# Patient Record
Sex: FEMALE | Race: WHITE | NOT HISPANIC OR LATINO | Employment: OTHER | URBAN - METROPOLITAN AREA
[De-identification: names, ages, dates, MRNs, and addresses within clinical notes are randomized per-mention and may not be internally consistent; named-entity substitution may affect disease eponyms.]

---

## 2018-08-27 LAB — HCV AB SER-ACNC: NEGATIVE

## 2022-09-13 ENCOUNTER — OFFICE VISIT (OUTPATIENT)
Dept: FAMILY MEDICINE CLINIC | Facility: CLINIC | Age: 57
End: 2022-09-13

## 2022-09-13 VITALS
HEART RATE: 65 BPM | WEIGHT: 249.2 LBS | TEMPERATURE: 97.8 F | BODY MASS INDEX: 39.11 KG/M2 | OXYGEN SATURATION: 98 % | HEIGHT: 67 IN | SYSTOLIC BLOOD PRESSURE: 128 MMHG | DIASTOLIC BLOOD PRESSURE: 82 MMHG

## 2022-09-13 DIAGNOSIS — I89.0 LYMPHEDEMA OF LEFT LEG: ICD-10-CM

## 2022-09-13 DIAGNOSIS — F41.9 ANXIETY: ICD-10-CM

## 2022-09-13 DIAGNOSIS — G89.29 CHRONIC PAIN OF LEFT KNEE: ICD-10-CM

## 2022-09-13 DIAGNOSIS — N85.8 UTERINE CYST: ICD-10-CM

## 2022-09-13 DIAGNOSIS — F33.0 MILD EPISODE OF RECURRENT MAJOR DEPRESSIVE DISORDER (HCC): ICD-10-CM

## 2022-09-13 DIAGNOSIS — Z12.31 ENCOUNTER FOR SCREENING MAMMOGRAM FOR BREAST CANCER: ICD-10-CM

## 2022-09-13 DIAGNOSIS — E03.9 ACQUIRED HYPOTHYROIDISM: ICD-10-CM

## 2022-09-13 DIAGNOSIS — I10 PRIMARY HYPERTENSION: ICD-10-CM

## 2022-09-13 DIAGNOSIS — Z12.4 SCREENING FOR CERVICAL CANCER: ICD-10-CM

## 2022-09-13 DIAGNOSIS — M25.562 CHRONIC PAIN OF LEFT KNEE: ICD-10-CM

## 2022-09-13 DIAGNOSIS — E78.2 MIXED HYPERLIPIDEMIA: ICD-10-CM

## 2022-09-13 DIAGNOSIS — Z00.00 ANNUAL PHYSICAL EXAM: Primary | ICD-10-CM

## 2022-09-13 PROCEDURE — 99386 PREV VISIT NEW AGE 40-64: CPT | Performed by: NURSE PRACTITIONER

## 2022-09-13 RX ORDER — MILK THISTLE 500 MG
CAPSULE ORAL
COMMUNITY

## 2022-09-13 RX ORDER — LEVOTHYROXINE AND LIOTHYRONINE 57; 13.5 UG/1; UG/1
90 TABLET ORAL DAILY
Qty: 30 TABLET | Refills: 2 | Status: SHIPPED | OUTPATIENT
Start: 2022-09-13

## 2022-09-13 RX ORDER — DILTIAZEM HYDROCHLORIDE 180 MG/1
360 CAPSULE, COATED, EXTENDED RELEASE ORAL DAILY
COMMUNITY
Start: 2020-09-12

## 2022-09-13 RX ORDER — CITALOPRAM 20 MG/1
10 TABLET ORAL DAILY
COMMUNITY
Start: 2002-09-12

## 2022-09-13 RX ORDER — MELATONIN
10000 DAILY
COMMUNITY

## 2022-09-13 RX ORDER — LEVOTHYROXINE AND LIOTHYRONINE 57; 13.5 UG/1; UG/1
90 TABLET ORAL DAILY
COMMUNITY
Start: 2017-09-12 | End: 2022-09-13 | Stop reason: SDUPTHER

## 2022-09-13 RX ORDER — ATORVASTATIN CALCIUM 10 MG/1
10 TABLET, FILM COATED ORAL DAILY
COMMUNITY
Start: 2019-09-12

## 2022-09-13 RX ORDER — CHLORAL HYDRATE 500 MG
1000 CAPSULE ORAL DAILY
COMMUNITY

## 2022-09-13 NOTE — PATIENT INSTRUCTIONS

## 2022-09-13 NOTE — PROGRESS NOTES
ADULT ANNUAL [address]    NAME: Lenka Wayne  AGE: 61 y o  SEX: female  : 1965     DATE: 2022     Assessment and Plan:     Problem List Items Addressed This Visit        Endocrine    Acquired hypothyroidism    Relevant Medications    thyroid (ARMOUR) 90 MG tablet       Cardiovascular and Mediastinum    Primary hypertension    Relevant Medications    diltiazem (CARDIZEM CD) 180 mg 24 hr capsule       Other    Mixed hyperlipidemia - Primary    Relevant Medications    atorvastatin (LIPITOR) 10 mg tablet    Mild episode of recurrent major depressive disorder (HCC)    Relevant Medications    citalopram (CeleXA) 20 mg tablet    Other Relevant Orders    Ambulatory Referral to [id number]    Relevant Orders    Ambulatory Referral to Paddy Kim      Other Visit Diagnoses     Screening for cervical cancer        Relevant Orders    Ambulatory referral to Obstetrics / Gynecology    Encounter for screening mammogram for breast cancer        Relevant Orders    Mammo screening bilateral w 3d & cad    Annual physical exam        BMI 39 0-39 9,adult              Immunizations and preventive care screenings were discussed with patient today  Appropriate education was printed on patient's after visit summary  Counseling:  Exercise: the importance of regular exercise/physical activity was discussed  Recommend exercise 3-5 times per week for at least 30 minutes  · Chronic issues  BMI Counseling: Body mass index is 39 62 kg/m²  The BMI is above normal  Nutrition recommendations include encouraging healthy choices of fruits and vegetables  Rationale for BMI follow-up plan is due to patient being overweight or obese  Refill sent for thyroid medication  Reviewed lab work completed in April  She is due for a mammogram in 2022      With the history of a uterine cyst I feel it would be best for her to establish with GYN  Notes from past GYN in [facility]  Unsure of last colonoscopy - unable to locate in her records  Suspect left leg swelling is from left knee issues - many past injuries, she has an appt upcoming with an orthopedic  She is also start physical therapy for her left knee  Return in 6 months (on 3/13/2023)  Chief Complaint:     Chief Complaint   Patient presents with    Physical Exam    BMI follow up due      History of Present Illness:     Adult Annual Physical   Patient here for a comprehensive physical exam  The patient reports problems - establish care, needs thyroid med refill       Diet and Physical Activity  · Diet/Nutrition: well balanced diet  · Exercise: no formal exercise  Depression Screening  PHQ-2/9 Depression Screening    Little interest or pleasure in doing things: 1 - several days  Feeling down, depressed, or hopeless: 1 - several days  PHQ-2 Score: 2  PHQ-2 Interpretation: Negative depression screen       General Health  · Sleep: sleeps well  · Hearing: normal - bilateral   · Vision: no vision problems  · Dental: brushes teeth twice daily  /GYN Health  · Patient is: postmenopausal  ·      Review of Systems:     Review of Systems   Constitutional: Negative  HENT: Negative  Respiratory: Negative  Cardiovascular: Positive for leg swelling (states of some left leg swelling, history of left knee injuries - larger than right when compared  Swelling lessens with elevation and increased movement  )  Gastrointestinal: Negative  Neurological: Negative  All other systems reviewed and are negative  Past Medical History:     Past Medical History:   Diagnosis Date    Atrial fibrillation ([facility] [address] ) 10/2020      Past Surgical History:     History reviewed  No pertinent surgical history     Social History:     Social History     Socioeconomic History    Marital status: Unknown     Spouse name: None    Number of children: None    Years of education: None    Highest education level: None   Occupational History    None   Tobacco Use    Smoking status: Never Smoker    Smokeless tobacco: Never Used   Substance and Sexual Activity    Alcohol use: Not Currently    Drug use: None    Sexual activity: None   Other Topics Concern    None   Social History Narrative    None     Social Determinants of Health     Financial Resource Strain: Not on file   Food Insecurity: Not on file   Transportation Needs: Not on file   Physical Activity: Not on file   Stress: Not on file   Social Connections: Not on file   Intimate Partner Violence: Not on file   Housing Stability: Not on file      Family History:     History reviewed  No pertinent family history  Current Medications:     Current Outpatient Medications   Medication Sig Dispense Refill    atorvastatin (LIPITOR) 10 mg tablet Take 10 mg by mouth in the morning      cholecalciferol (VITAMIN D3) 1,000 units tablet Take 10,000 Units by mouth daily      citalopram (CeleXA) 20 mg tablet Take 10 mg by mouth in the morning      co-enzyme Q-10 30 MG capsule Take 100 mg by mouth 3 (three) times a day      Digestive Enzymes (SIMILASE PO) Take 2 capsules by mouth 2 (two) times a day      diltiazem (CARDIZEM CD) 180 mg 24 hr capsule Take 360 mg by mouth in the morning      Magnesium 400 MG CAPS Take by mouth      Milk Thistle 500 MG CAPS Take by mouth      Omega-3 Fatty Acids (fish oil) 1,000 mg Take 1,000 mg by mouth daily      thyroid (ARMOUR) 90 MG tablet Take 90 mg by mouth in the morning       No current facility-administered medications for this visit  Allergies:      Allergies   Allergen Reactions    Penicillins Other (See Comments)     Family hx of allergy to penicillin      Physical Exam:     /82 (BP Location: Right arm, Patient Position: Sitting)   Pulse 65   Temp 97 8 °F (36 6 °C)   Ht 5' 6 5" (1 689 m)   Wt 113 kg (249 lb 3 2 oz)   SpO2 98%   BMI 39 62 kg/m²     Physical Exam  Vitals and nursing note reviewed  Constitutional:       General: She is not in acute distress  Appearance: Normal appearance  She is well-developed  HENT:      Head: Normocephalic and atraumatic  Eyes:      Conjunctiva/sclera: Conjunctivae normal    Cardiovascular:      Rate and Rhythm: Normal rate and regular rhythm  Heart sounds: No murmur heard  No gallop  Pulmonary:      Effort: Pulmonary effort is normal  No respiratory distress  Breath sounds: Normal breath sounds  Abdominal:      Palpations: Abdomen is soft  Tenderness: There is no abdominal tenderness  Musculoskeletal:      Cervical back: Neck supple  Left knee: Swelling and deformity present  Decreased range of motion  Skin:     General: Skin is warm and dry  Neurological:      General: No focal deficit present  Mental Status: She is alert and oriented to person, place, and time  Mental status is at baseline  Psychiatric:         Mood and Affect: Mood normal          Behavior: Behavior normal          Thought Content:  Thought content normal          Judgment: Judgment normal           PIERRE Mclaughlin  [facility] PRIMARY [facility]

## 2022-09-15 ENCOUNTER — TELEPHONE (OUTPATIENT)
Dept: ADMINISTRATIVE | Facility: OTHER | Age: 57
End: 2022-09-15

## 2022-09-15 NOTE — TELEPHONE ENCOUNTER
Upon review of the In Basket request we were able to locate, review, and update the patient chart as requested for Mammogram     Any additional questions or concerns should be emailed to the Practice Liaisons via [email or web address] email, please do not reply via In Basket      Thank you  Ravi Ruiz

## 2022-09-15 NOTE — TELEPHONE ENCOUNTER
----- Message from Renato Arredondo sent at 9/14/2022  3:59 PM EDT -----  Regarding: Care Gap Update  09/14/22 3:59 PM    Hello, our patient attached above has had Mammogram completed/performed  Please assist in updating the patient chart by pulling the Care Everywhere (CE) document  The date of service is 11/2021       Thank you,  Renato Arredondo   [facility]

## 2022-09-29 DIAGNOSIS — G89.29 CHRONIC PAIN OF BOTH KNEES: Primary | ICD-10-CM

## 2022-09-29 DIAGNOSIS — M25.562 CHRONIC PAIN OF BOTH KNEES: Primary | ICD-10-CM

## 2022-09-29 DIAGNOSIS — I89.0 LYMPHEDEMA OF LEFT LEG: ICD-10-CM

## 2022-09-29 DIAGNOSIS — M25.561 CHRONIC PAIN OF BOTH KNEES: Primary | ICD-10-CM

## 2022-11-01 ENCOUNTER — HOSPITAL ENCOUNTER (OUTPATIENT)
Dept: RADIOLOGY | Facility: CLINIC | Age: 57
Discharge: HOME | End: 2022-11-01

## 2022-11-01 VITALS — BODY MASS INDEX: 39.24 KG/M2 | WEIGHT: 250 LBS | HEIGHT: 67 IN

## 2022-11-01 DIAGNOSIS — Z12.31 ENCOUNTER FOR SCREENING MAMMOGRAM FOR BREAST CANCER: ICD-10-CM

## 2023-01-18 ENCOUNTER — CONSULT (OUTPATIENT)
Dept: FAMILY MEDICINE CLINIC | Facility: CLINIC | Age: 58
End: 2023-01-18

## 2023-01-18 VITALS
BODY MASS INDEX: 39.86 KG/M2 | DIASTOLIC BLOOD PRESSURE: 72 MMHG | OXYGEN SATURATION: 94 % | HEART RATE: 74 BPM | SYSTOLIC BLOOD PRESSURE: 139 MMHG | HEIGHT: 66 IN | WEIGHT: 248 LBS | TEMPERATURE: 98 F

## 2023-01-18 DIAGNOSIS — Z99.89 CPAP (CONTINUOUS POSITIVE AIRWAY PRESSURE) DEPENDENCE: ICD-10-CM

## 2023-01-18 DIAGNOSIS — G47.33 OBSTRUCTIVE SLEEP APNEA SYNDROME: ICD-10-CM

## 2023-01-18 DIAGNOSIS — E03.9 ACQUIRED HYPOTHYROIDISM: ICD-10-CM

## 2023-01-18 DIAGNOSIS — Z01.818 PREOPERATIVE EXAMINATION: Primary | ICD-10-CM

## 2023-01-18 DIAGNOSIS — I10 PRIMARY HYPERTENSION: ICD-10-CM

## 2023-01-18 DIAGNOSIS — M17.0 BILATERAL PRIMARY OSTEOARTHRITIS OF KNEE: ICD-10-CM

## 2023-01-18 DIAGNOSIS — E78.2 MIXED HYPERLIPIDEMIA: ICD-10-CM

## 2023-01-18 PROBLEM — I48.0 PAROXYSMAL ATRIAL FIBRILLATION (HCC): Status: RESOLVED | Noted: 2020-10-21 | Resolved: 2023-01-18

## 2023-01-18 PROBLEM — I48.0 PAROXYSMAL ATRIAL FIBRILLATION (HCC): Status: ACTIVE | Noted: 2020-10-21

## 2023-01-18 RX ORDER — MELOXICAM 15 MG/1
TABLET ORAL
COMMUNITY
Start: 2022-12-20

## 2023-01-18 RX ORDER — B-COMPLEX WITH VITAMIN C
2 TABLET ORAL
COMMUNITY

## 2023-01-18 NOTE — PROGRESS NOTES
Name: Ana Lilia Gutierrez      : 1965      MRN: [id number]  Encounter Provider: PIERRE Bucio  Encounter Date: 2023   Encounter department: [address] [place] [address],[address]     1  Preoperative examination    Assessment of Chronic Conditions:  Hypothyroidism  Hypertension  Hyperlipidemia - on statin  Sleep Apnea - uses cpap    Assessment of Cardiac Risk:  Hx of HLD, on statin    Renal:  GFR 70 (2022)    Prior Anesthesia Reactions:  None      Anticoagulant Use? No      Medically Stable to proceed with surgery? Yes    Meds to take AM of surgery:    Would recommend diltiazem and possibly thyroid medication  Subjective      Here today for a preoperative examination  Pt has been seen by orthopedic surgery to have a left knee replacement  B/l knee xray's show end-stage arthritis of both knees  Her left knee though is worse than her right  She is unable to straighten that knee  She has difficulty going up stairs  She is with a past episode of atrial fibrillation that was corrected with starting diltiazem  Reports she wore a holter monitor after that for two weeks with no aberrant rhythm noted  She is not on any blood thinning medication but remains on diltiazem as it has help control her blood pressure  She is with a hx of sleep apnea but uses a CPAP machine nightly  Review of Systems   Cardiovascular:        See HPI   Musculoskeletal:        See HPI   All other systems reviewed and are negative        Current Outpatient Medications on File Prior to Visit   Medication Sig   • atorvastatin (LIPITOR) 10 mg tablet Take 10 mg by mouth in the morning   • cholecalciferol (VITAMIN D3) 1,000 units tablet Take 10,000 Units by mouth daily   • citalopram (CeleXA) 20 mg tablet Take 10 mg by mouth in the morning   • co-enzyme Q-10 30 MG capsule Take 100 mg by mouth 3 (three) times a day   • Digestive Enzymes (SIMILASE PO) Take 2 capsules by mouth 2 (two) times a day   • diltiazem (CARDIZEM CD) 180 mg 24 hr capsule Take 360 mg by mouth in the morning   • Magnesium 400 MG CAPS Take by mouth   • Milk Thistle 500 MG CAPS Take by mouth   • Omega-3 Fatty Acids (fish oil) 1,000 mg Take 1,000 mg by mouth daily   • thyroid (ARMOUR) 90 MG tablet Take 1 tablet (90 mg total) by mouth in the morning       Objective     There were no vitals taken for this visit  Physical Exam  Constitutional:       Appearance: Normal appearance  Cardiovascular:      Rate and Rhythm: Normal rate and regular rhythm  Pulmonary:      Effort: Pulmonary effort is normal       Breath sounds: Normal breath sounds  Neurological:      Mental Status: She is alert  Psychiatric:         Mood and Affect: Mood normal          Behavior: Behavior normal          Thought Content:  Thought content normal          Judgment: Judgment normal        PIERRE Falk

## 2023-02-06 DIAGNOSIS — I10 PRIMARY HYPERTENSION: Primary | ICD-10-CM

## 2023-02-06 RX ORDER — DILTIAZEM HYDROCHLORIDE 180 MG/1
360 CAPSULE, COATED, EXTENDED RELEASE ORAL DAILY
Qty: 30 CAPSULE | Refills: 3 | Status: SHIPPED | OUTPATIENT
Start: 2023-02-06

## 2023-02-08 ENCOUNTER — TELEPHONE (OUTPATIENT)
Dept: PSYCHIATRY | Facility: CLINIC | Age: 58
End: 2023-02-08

## 2023-02-08 NOTE — TELEPHONE ENCOUNTER
Contacted patient in regards to Routine Referral in attempts to verify patient's needs of services and add patient to proper wait list  spoke with patient whom stated they were working with different facility for counseling and would not like to pursure through [facility]        Referral closed per request

## 2023-03-15 ENCOUNTER — OFFICE VISIT (OUTPATIENT)
Dept: FAMILY MEDICINE CLINIC | Facility: CLINIC | Age: 58
End: 2023-03-15

## 2023-03-15 VITALS
SYSTOLIC BLOOD PRESSURE: 130 MMHG | WEIGHT: 246.4 LBS | OXYGEN SATURATION: 98 % | HEIGHT: 66 IN | BODY MASS INDEX: 39.6 KG/M2 | DIASTOLIC BLOOD PRESSURE: 82 MMHG | HEART RATE: 92 BPM | TEMPERATURE: 97.8 F

## 2023-03-15 DIAGNOSIS — E03.9 ACQUIRED HYPOTHYROIDISM: ICD-10-CM

## 2023-03-15 DIAGNOSIS — G47.33 OBSTRUCTIVE SLEEP APNEA SYNDROME: ICD-10-CM

## 2023-03-15 DIAGNOSIS — Z99.89 CPAP (CONTINUOUS POSITIVE AIRWAY PRESSURE) DEPENDENCE: ICD-10-CM

## 2023-03-15 DIAGNOSIS — Z96.652 STATUS POST LEFT KNEE REPLACEMENT: ICD-10-CM

## 2023-03-15 DIAGNOSIS — I10 PRIMARY HYPERTENSION: ICD-10-CM

## 2023-03-15 DIAGNOSIS — F33.0 MILD EPISODE OF RECURRENT MAJOR DEPRESSIVE DISORDER (HCC): Primary | ICD-10-CM

## 2023-03-15 RX ORDER — ASPIRIN 81 MG/1
TABLET ORAL
COMMUNITY
Start: 2023-02-17

## 2023-03-15 RX ORDER — DOCUSATE SODIUM 100 MG/1
CAPSULE, LIQUID FILLED ORAL
COMMUNITY
Start: 2023-02-17

## 2023-03-15 RX ORDER — OXYCODONE HYDROCHLORIDE 5 MG/1
TABLET ORAL
COMMUNITY
Start: 2023-02-17

## 2023-03-15 RX ORDER — SENNOSIDES 8.6 MG/1
TABLET, COATED ORAL
COMMUNITY
Start: 2023-02-17

## 2023-03-15 RX ORDER — PANTOPRAZOLE SODIUM 40 MG/1
TABLET, DELAYED RELEASE ORAL
COMMUNITY
Start: 2023-02-17

## 2023-03-15 RX ORDER — TRAMADOL HYDROCHLORIDE 50 MG/1
TABLET ORAL
COMMUNITY
Start: 2023-02-17

## 2023-03-15 RX ORDER — CELECOXIB 100 MG/1
CAPSULE ORAL
COMMUNITY
Start: 2023-02-17

## 2023-03-15 RX ORDER — UREA 10 %
1 LOTION (ML) TOPICAL
COMMUNITY

## 2023-03-15 NOTE — PROGRESS NOTES
Name: Celesta Moritz      : 1965      MRN: [id number]  Encounter Provider: Mey MondayPIERRE  Encounter Date: 3/15/2023   Encounter department: [address] [place] [address],[address]     1  Mild episode of recurrent major depressive disorder (HCC)  Assessment & Plan:  Stable, no concerns at this time, continues with citalopram         2  Primary hypertension  Assessment & Plan:  Controlled, continue with current medication regimen  3  CPAP (continuous positive airway pressure) dependence  Assessment & Plan:  Continues, no issues  4  Obstructive sleep apnea syndrome    5  Acquired hypothyroidism  Assessment & Plan:  Continues with thyroid medication  6  Status post left knee replacement     Overall is doing well post knee replacement  She is doing well with PT twice a week at this time  Subjective      Here today for a follow-up ;  She recently had left knee replacement surgery in February and reports she is recovering well  She is undergoing physical therapy bi-weekly with success  She is only taking acetaminophen for pain  Hx of HTN and HLD - no issues at his time  Hx of hypothyroid - on medication  Hx of sleep apnea - uses a CPAP  Review of Systems   Constitutional: Negative  HENT: Negative  Respiratory: Negative  Cardiovascular: Negative  Gastrointestinal: Negative  Musculoskeletal:        See HPI   Neurological: Negative  All other systems reviewed and are negative        Current Outpatient Medications on File Prior to Visit   Medication Sig   • aspirin (ECOTRIN LOW STRENGTH) 81 mg EC tablet    • atorvastatin (LIPITOR) 10 mg tablet Take 10 mg by mouth in the morning   • B Complex Vitamins (Vitamin B Complex) TABS Take 2 tablets by mouth   • Barberry-Oreg Grape-Goldenseal 200-200-50 MG CAPS Take 500 mg by mouth   • celecoxib (CeleBREX) 100 mg capsule    • cholecalciferol (VITAMIN D3) 1,000 units tablet Take 10,000 Units by mouth daily   • citalopram (CeleXA) 20 mg tablet Take 10 mg by mouth in the morning   • co-enzyme Q-10 30 MG capsule Take 100 mg by mouth 3 (three) times a day   • Digestive Enzymes (SIMILASE PO) Take 2 capsules by mouth 2 (two) times a day   • diltiazem (CARDIZEM CD) 180 mg 24 hr capsule Take 2 capsules (360 mg total) by mouth in the morning   • docusate sodium (COLACE) 100 mg capsule    • Lactobacillus TABS Take 1 tablet by mouth   • Magnesium 400 MG CAPS Take by mouth   • meloxicam (MOBIC) 15 mg tablet TAKE 1 TABLET (15 MG) BY MOUTH IN THE MORNING  • Milk Thistle 500 MG CAPS Take by mouth   • Omega-3 Fatty Acids (fish oil) 1,000 mg Take 1,000 mg by mouth daily   • pantoprazole (PROTONIX) 40 mg tablet    • Senna-Time 8 6 MG tablet    • thyroid (ARMOUR) 90 MG tablet Take 1 tablet (90 mg total) by mouth in the morning   • oxyCODONE (ROXICODONE) 5 immediate release tablet    • traMADol (ULTRAM) 50 mg tablet        Objective     /82 (BP Location: Right arm, Patient Position: Sitting, Cuff Size: Standard)   Pulse 92   Temp 97 8 °F (36 6 °C)   Ht 5' 6" (1 676 m)   Wt 112 kg (246 lb 6 4 oz)   SpO2 98%   BMI 39 77 kg/m²     Physical Exam  Constitutional:       Appearance: Normal appearance  Cardiovascular:      Rate and Rhythm: Normal rate and regular rhythm  Pulmonary:      Effort: Pulmonary effort is normal       Breath sounds: Normal breath sounds  Neurological:      Mental Status: She is alert  Psychiatric:         Mood and Affect: Mood normal          Behavior: Behavior normal          Thought Content:  Thought content normal          Judgment: Judgment normal        PIERRE Szymanski

## 2023-03-27 DIAGNOSIS — I10 PRIMARY HYPERTENSION: ICD-10-CM

## 2023-03-27 RX ORDER — DILTIAZEM HYDROCHLORIDE 180 MG/1
CAPSULE, COATED, EXTENDED RELEASE ORAL
Qty: 30 CAPSULE | Refills: 3 | Status: SHIPPED | OUTPATIENT
Start: 2023-03-27

## 2023-05-08 DIAGNOSIS — F41.9 ANXIETY: Primary | ICD-10-CM

## 2023-05-08 RX ORDER — CITALOPRAM 20 MG/1
20 TABLET ORAL DAILY
COMMUNITY
End: 2023-05-08 | Stop reason: SDUPTHER

## 2023-05-08 RX ORDER — CITALOPRAM 20 MG/1
10 TABLET ORAL DAILY
Qty: 45 TABLET | Refills: 1 | Status: SHIPPED | OUTPATIENT
Start: 2023-05-08

## 2023-06-07 NOTE — PATIENT INSTRUCTIONS
Wellness Visit for Adults   AMBULATORY CARE:   A wellness visit  is when you see your healthcare provider to get screened for health problems  Your healthcare provider will also give you advice on how to stay healthy  Write down your questions so you remember to ask them  Ask your healthcare provider how often you should have a wellness visit  What happens at a wellness visit:  Your healthcare provider will ask about your health, and your family history of health problems  This includes high blood pressure, heart disease, and cancer  He or she will ask if you have symptoms that concern you, if you smoke, and about your mood  You may also be asked about your intake of medicines, supplements, food, and alcohol  Any of the following may be done: Your weight  will be checked  Your height may also be checked so your body mass index (BMI) can be calculated  Your BMI shows if you are at a healthy weight  Your blood pressure  and heart rate will be checked  Your temperature may also be checked  Blood and urine tests  may be done  Blood tests may be done to check your cholesterol levels  Abnormal cholesterol levels increase your risk for heart disease and stroke  You may also need a blood or urine test to check for diabetes if you are at increased risk  Urine tests may be done to look for signs of an infection or kidney disease  A physical exam  includes checking your heartbeat and lungs with a stethoscope  Your healthcare provider may also check your skin to look for sun damage  Screening tests  may be recommended  A screening test is done to check for diseases that may not cause symptoms  The screening tests you may need depend on your age, gender, family history, and lifestyle habits  For example, colorectal screening may be recommended if you are 48years old or older  Screening tests you need if you are a woman:   A Pap smear  is used to screen for cervical cancer   Pap smears are usually done every 3 to 5 years depending on your age  You may need them more often if you have had abnormal Pap smear test results in the past  Ask your healthcare provider how often you should have a Pap smear  A mammogram  is an x-ray of your breasts to screen for breast cancer  Experts recommend mammograms every 2 years starting at age 48 years  You may need a mammogram at age 52 years or younger if you have an increased risk for breast cancer  Talk to your healthcare provider about when you should start having mammograms and how often you need them  Vaccines you may need:   Get an influenza vaccine  every year  The influenza vaccine protects you from the flu  Several types of viruses cause the flu  The viruses change over time, so new vaccines are made each year  Get a tetanus-diphtheria (Td) booster vaccine  every 10 years  This vaccine protects you against tetanus and diphtheria  Tetanus is a severe infection that may cause painful muscle spasms and lockjaw  Diphtheria is a severe bacterial infection that causes a thick covering in the back of your mouth and throat  Get a human papillomavirus (HPV) vaccine  if you are female and aged 23 to 32 or male 23 to 24 and never received it  This vaccine protects you from HPV infection  HPV is the most common infection spread by sexual contact  HPV may also cause vaginal, penile, and anal cancers  Get a pneumococcal vaccine  if you are aged 72 years or older  The pneumococcal vaccine is an injection given to protect you from pneumococcal disease  Pneumococcal disease is an infection caused by pneumococcal bacteria  The infection may cause pneumonia, meningitis, or an ear infection  Get a shingles vaccine  if you are 60 or older, even if you have had shingles before  The shingles vaccine is an injection to protect you from the varicella-zoster virus  This is the same virus that causes chickenpox   Shingles is a painful rash that develops in people who had chickenpox or have been exposed to the virus  How to eat healthy:  My Plate is a model for planning healthy meals  It shows the types and amounts of foods that should go on your plate  Fruits and vegetables make up about half of your plate, and grains and protein make up the other half  A serving of dairy is included on the side of your plate  The amount of calories and serving sizes you need depends on your age, gender, weight, and height  Examples of healthy foods are listed below:  Eat a variety of vegetables  such as dark green, red, and orange vegetables  You can also include canned vegetables low in sodium (salt) and frozen vegetables without added butter or sauces  Eat a variety of fresh fruits , canned fruit in 100% juice, frozen fruit, and dried fruit  Include whole grains  At least half of the grains you eat should be whole grains  Examples include whole-wheat bread, wheat pasta, brown rice, and whole-grain cereals such as oatmeal     Eat a variety of protein foods such as seafood (fish and shellfish), lean meat, and poultry without skin (turkey and chicken)  Examples of lean meats include pork leg, shoulder, or tenderloin, and beef round, sirloin, tenderloin, and extra lean ground beef  Other protein foods include eggs and egg substitutes, beans, peas, soy products, nuts, and seeds  Choose low-fat dairy products such as skim or 1% milk or low-fat yogurt, cheese, and cottage cheese  Limit unhealthy fats  such as butter, hard margarine, and shortening  Exercise:  Exercise at least 30 minutes per day on most days of the week  Some examples of exercise include walking, biking, dancing, and swimming  You can also fit in more physical activity by taking the stairs instead of the elevator or parking farther away from stores  Include muscle strengthening activities 2 days each week  Regular exercise provides many health benefits   It helps you manage your weight, and decreases your risk for type 2 diabetes, heart disease, stroke, and high blood pressure  Exercise can also help improve your mood  Ask your healthcare provider about the best exercise plan for you  General health and safety guidelines:   Do not smoke  Nicotine and other chemicals in cigarettes and cigars can cause lung damage  Ask your healthcare provider for information if you currently smoke and need help to quit  E-cigarettes or smokeless tobacco still contain nicotine  Talk to your healthcare provider before you use these products  Limit alcohol  A drink of alcohol is 12 ounces of beer, 5 ounces of wine, or 1½ ounces of liquor  Lose weight, if needed  Being overweight increases your risk of certain health conditions  These include heart disease, high blood pressure, type 2 diabetes, and certain types of cancer  Protect your skin  Do not sunbathe or use tanning beds  Use sunscreen with a SPF 15 or higher  Apply sunscreen at least 15 minutes before you go outside  Reapply sunscreen every 2 hours  Wear protective clothing, hats, and sunglasses when you are outside  Drive safely  Always wear your seatbelt  Make sure everyone in your car wears a seatbelt  A seatbelt can save your life if you are in an accident  Do not use your cell phone when you are driving  This could distract you and cause an accident  Pull over if you need to make a call or send a text message  Practice safe sex  Use latex condoms if are sexually active and have more than one partner  Your healthcare provider may recommend screening tests for sexually transmitted infections (STIs)  Wear helmets, lifejackets, and protective gear  Always wear a helmet when you ride a bike or motorcycle, go skiing, or play sports that could cause a head injury  Wear protective equipment when you play sports  Wear a lifejacket when you are on a boat or doing water sports      © Copyright [employer] 2022 Information is for End User's use only and may not be sold, redistributed or otherwise used for commercial purposes  The above information is an  only  It is not intended as medical advice for individual conditions or treatments  Talk to your doctor, nurse or pharmacist before following any medical regimen to see if it is safe and effective for you

## 2023-06-07 NOTE — PROGRESS NOTES
Assessment        Diagnoses and all orders for this visit:    Encntr for gyn exam (general) (routine) w/o abn findings    Cervical cancer screening  -     Liquid-based pap, screening    Intramural leiomyoma of uterus  -     US pelvis complete w transvaginal; Future    Postmenopausal bleeding  -     US pelvis complete w transvaginal; Future             Plan      All questions answered  Await pap smear results  Discussed healthy lifestyle modifications  Educational material distributed  Follow up in 3 weeks  Follow up as needed  Pap smear  Pelvic ultrasound  Patient with history of fibroids  Pelvic ultrasound was ordered  Also recommended endometrial biopsy due to postmenopausal bleeding  Patient to return to the office for discussion of results, endometrial biopsy  Keara Lewis is a 60 y o  female who presents for annual exam       Chief Complaint   Patient presents with   • New Patient Visit   • Gynecologic Exam     Pt states that a few years ago she had a benign 7 cm cyst, pt with AUB for 1 day and was just when whiping, period cramping, previous GYN did recommend Hysterectomy  Mother had hx of endometriosis  Previous OBG[facility] [place]  7 on uterus, biopsies in uterus- benign    She has no periods  She has some pain in pelvic area, and noticed some blood on toilet paper on Monday  Family h/o endometriosis  She is not sexually active       passed away in Aug 2020 due to prostate CA      Last Pap: 19 NILM   Last mammogram: 22  Colorectal cancer screenin years, due every 10 years    Current contraception: post menopausal status  History of abnormal Pap smear: no  History of abnormal mammogram: no  Family history of uterine or ovarian cancer: no  Family history of breast cancer: no  Family history of colon cancer: no    Family h/o esophageal ca  Mom - peritoneal ca      OB History    Para Term  AB Living   0 0 0 0 0 0   SAB IAB Ectopic Multiple Live Births   0 0 0 0 0       Menstrual History:  OB History        0    Para   0    Term   0            AB        Living           SAB        IAB        Ectopic        Multiple        Live Births                    Menarche age: 14  No LMP recorded   Patient is postmenopausal    Menopause: 45          Past Medical History:   Diagnosis Date   • Atrial fibrillation ([facility] [address] ) 10/2020     Past Surgical History:   Procedure Laterality Date   • BREAST BIOPSY Left     stereotactic bx - benign   • KNEE SURGERY Left 2023     Family History   Problem Relation Age of Onset   • Cancer Mother    • Esophageal cancer Father    • Prostate cancer Father    • No Known Problems Sister    • No Known Problems Sister    • No Known Problems Maternal Grandmother    • Prostate cancer Maternal Grandfather    • No Known Problems Paternal Grandmother    • No Known Problems Paternal Grandfather    • Stomach cancer Maternal Aunt    • Leukemia Maternal Aunt    • Breast cancer Maternal Aunt    • Breast cancer Paternal Aunt    • No Known Problems Paternal Aunt        Social History     Tobacco Use   • Smoking status: Never   • Smokeless tobacco: Never   Vaping Use   • Vaping Use: Never used   Substance Use Topics   • Alcohol use: Not Currently   • Drug use: Never          Current Outpatient Medications:   •  atorvastatin (LIPITOR) 10 mg tablet, Take 10 mg by mouth in the morning, Disp: , Rfl:   •  B Complex Vitamins (Vitamin B Complex) TABS, Take 2 tablets by mouth, Disp: , Rfl:   •  Barberry-Oreg Grape-Goldenseal 200-200-50 MG CAPS, Take 500 mg by mouth, Disp: , Rfl:   •  cholecalciferol (VITAMIN D3) 1,000 units tablet, Take 10,000 Units by mouth daily, Disp: , Rfl:   •  citalopram (CeleXA) 20 mg tablet, Take 0 5 tablets (10 mg total) by mouth in the morning, Disp: 45 tablet, Rfl: 1  •  co-enzyme Q-10 30 MG capsule, Take 100 mg by mouth 2 (two) times a day, Disp: , Rfl:   •  Digestive Enzymes (SIMILASE PO), Take 1 capsule by "mouth 2 (two) times a day, Disp: , Rfl:   •  diltiazem (CARDIZEM CD) 180 mg 24 hr capsule, TAKE 2 CAPSULES (360 MG) BY MOUTH IN THE MORNING, Disp: 30 capsule, Rfl: 3  •  Lactobacillus TABS, Take 1 tablet by mouth, Disp: , Rfl:   •  Magnesium 400 MG CAPS, Take by mouth, Disp: , Rfl:   •  Milk Thistle 500 MG CAPS, Take by mouth, Disp: , Rfl:   •  NP Thyroid 90 MG tablet, TAKE ONE TABLET BY MOUTH EVERY MORNING (Patient taking differently: Take 45 mg by mouth daily), Disp: 30 tablet, Rfl: 5  •  Omega-3 Fatty Acids (fish oil) 1,000 mg, Take 1,000 mg by mouth daily, Disp: , Rfl:     Allergies   Allergen Reactions   • Food Other (See Comments)     Wheat GI upset; joint inflammation   • Other Other (See Comments)     ENVIRONMENTAL ALLERGENS  Pollen, dust, mold : sneezing  PLANTS  Sneezing  PET DANDER  Sneezing     • Penicillins Other (See Comments)     Family hx of allergy to penicillin   • Wheat Bran - Food Allergy Abdominal Pain   • Horse-Derived Products Rash     Horse serum           Review of Systems    /70 (BP Location: Right arm, Patient Position: Sitting, Cuff Size: Adult)   Pulse 74   Temp (!) 97 1 °F (36 2 °C) (Tympanic)   Ht 5' 6\" (1 676 m)   Wt 115 kg (254 lb 9 6 oz)   BMI 41 09 kg/m²         OBGyn Exam        Future Appointments   Date Time Provider Rudy Hankins   9/19/2023  9:00 AM PIERRE Mclaughlin  [facility]   11/3/2023  8:30 AM [facility]     US GYN TRANSVAGINAL    Anatomical Region Laterality Modality   Pelvis -- Ultrasound   OB -- --     Narrative       Patient Name:   EVA TELLO    Date of Birth:  1965    Procedure: US GYN TRANSVAGINAL   -------- ADDENDUM #1 --------       ADDENDUM: CORRECTION TO FIRST CONCLUSION OF THE REPORT  There is a large uterine fibroid that extends from just anterior to the endometrium located in the intramural location that extends to the subserosal component as well   This fibroid currently measures " 4 6 x 3 7 x 6 4 cm  This is in the mid to upper anterior left uterus  This extends towards the endometrium but does not appear to be expanded into the endometrium  This is actually INCREASED IN SIZE since the previous study of December 28, 2010 where this fibroid measured 2 6 x 1 8 cm  In the report I said included decreased in size from a fibroid measuring 7 5 x 4 1 x 5 2 cm  This is an incorrect measurement of the fibroid from December 28, 2010  The correct measurement for the anterior fibroid is 2 6 x 1 8 cm  Electronically signed by: Genesis Cook MD   -------- ORIGINAL REPORT --------       EXAM: Transvaginal pelvic ultrasound     EXAM DATE: January 13, 2020     REASON FOR EXAM: Abnormal uterine bleeding  SCANNING PROTOCOL: Transvaginal pelvic ultrasound was performed per standard protocol   The endovaginal transducer was used for improved visualization of the endometrial cavity and adnexal regions   Doppler assessment of the left ovary was performed  The right ovary cannot be seen but the right adnexal region was evaluated  COMPARISON: Comparison is made with previous study of December 28, 2010     FINDINGS: The uterus is normal in position and measures 9 2 cm in length x 7 3 cm AP x 8 3 cm transverse  The myometrium is homogeneous  There is a large fibroid that 6 stones from the intramural location to the subserosal location measuring 4 6 x 3 7 x 6 4 cm in size just to the left of midline in the anterior mid to upper uterus  The endometrial stripe measures 6 0 mm in width and is normal in appearance   The endometrial measurement was taken from a sagittal image and includes both layers but excludes any intervening fluid  Both adnexal regions were evaluated  The right ovary is not visualized  The left ovary measures 2 4 cm x 1 4 cm x 0 9 cm   The left ovary is normal in appearance   No adnexal mass is identified       There is no significant free fluid in the cul-de-sac        Spectral analysis reveals usual waveform   Arterial inflow and venous outflow is preserved  IMPRESSION:   1  Large uterine fibroid that extends from just anterior to the endometrium located within the intramural location but has a subserosal component as well  This measures 4 6 x 3 7 x 6 4 cm This is located in the anterior mid to upper left uterus  There is 6 dense] of the endometrium but there is no evidence of a extending into the endometrium  This is similar to slightly decrease in size since the previous study of December 28, 2010 where the fibroid measured 7 5 x 4 1 x 5 2 cm    2  Nonvisualization of the right ovary  Normal left ovary  Electronically signed by: Zaki Johnson MD      Patient/Procedure Information:   [address] [place] [address] Street: [id number]    Order Number: [id number]    Accession Number: [id number]    Ordering Provider: Rohit White    Authorizing Provider: Rohit White  Procedure Note    Sera Estrada MD - 01/13/2020   Formatting of this note might be different from the original       Patient Name:   Zeyad Church    Date of Birth:  1965    Procedure: [address] [place] [address]   -------- ADDENDUM #1 --------       ADDENDUM: CORRECTION TO FIRST CONCLUSION OF THE REPORT  There is a large uterine fibroid that extends from just anterior to the endometrium located in the intramural location that extends to the subserosal component as well  This fibroid currently measures 4 6 x 3 7 x 6 4 cm  This is in the mid to upper anterior left uterus  This extends towards the endometrium but does not appear to be expanded into the endometrium  This is actually INCREASED IN SIZE since the previous study of December 28, 2010 where this fibroid measured 2 6 x 1 8 cm  In the report I said included decreased in size from a fibroid measuring 7 5 x 4 1 x 5 2 cm  This is an incorrect measurement of the fibroid from December 28, 2010   The correct measurement for the anterior fibroid is 2 6 x 1 8 cm      Electronically signed by: Chantel Rojas MD   -------- ORIGINAL REPORT --------       EXAM: Transvaginal pelvic ultrasound     EXAM DATE: January 13, 2020     REASON FOR EXAM: Abnormal uterine bleeding  SCANNING PROTOCOL: Transvaginal pelvic ultrasound was performed per standard protocol   The endovaginal transducer was used for improved visualization of the endometrial cavity and adnexal regions   Doppler assessment of the left ovary was performed  The right ovary cannot be seen but the right adnexal region was evaluated  COMPARISON: Comparison is made with previous study of December 28, 2010     FINDINGS: The uterus is normal in position and measures 9 2 cm in length x 7 3 cm AP x 8 3 cm transverse  The myometrium is homogeneous  There is a large fibroid that 6 stones from the intramural location to the subserosal location measuring 4 6 x 3 7 x 6 4 cm in size just to the left of midline in the anterior mid to upper uterus  The endometrial stripe measures 6 0 mm in width and is normal in appearance   The endometrial measurement was taken from a sagittal image and includes both layers but excludes any intervening fluid  Both adnexal regions were evaluated  The right ovary is not visualized  The left ovary measures 2 4 cm x 1 4 cm x 0 9 cm   The left ovary is normal in appearance   No adnexal mass is identified  There is no significant free fluid in the cul-de-sac        Spectral analysis reveals usual waveform   Arterial inflow and venous outflow is preserved  IMPRESSION:   1  Large uterine fibroid that extends from just anterior to the endometrium located within the intramural location but has a subserosal component as well  This measures 4 6 x 3 7 x 6 4 cm This is located in the anterior mid to upper left uterus  There is 6 dense] of the endometrium but there is no evidence of a extending into the endometrium   This is similar to slightly decrease in size since the previous study of December 28, 2010 where the fibroid measured 7 5 x 4 1 x 5 2 cm    2  Nonvisualization of the right ovary  Normal left ovary       Electronically signed by: Chantel Rojas MD      Patient/Procedure Information:   [address] [place] [address]: [id number]    Order Number: [id number]    Accession Number: [id number]    Ordering Provider: Sheri Shah    Authorizing Provider: Sheri Shah  Exam End: 01/13/20 10:25 AM    Specimen Collected: 01/13/20 10:54 AM Last Resulted: 01/13/20 10:59 AM   Received From: [address] [place] [address]  and Affiliates  Result Received: 06/07/23  2:20 PM

## 2023-06-08 ENCOUNTER — OFFICE VISIT (OUTPATIENT)
Dept: OBGYN CLINIC | Facility: CLINIC | Age: 58
End: 2023-06-08

## 2023-06-08 VITALS
BODY MASS INDEX: 40.92 KG/M2 | HEIGHT: 66 IN | HEART RATE: 74 BPM | WEIGHT: 254.6 LBS | TEMPERATURE: 97.1 F | DIASTOLIC BLOOD PRESSURE: 70 MMHG | SYSTOLIC BLOOD PRESSURE: 136 MMHG

## 2023-06-08 DIAGNOSIS — Z12.4 CERVICAL CANCER SCREENING: ICD-10-CM

## 2023-06-08 DIAGNOSIS — D25.1 INTRAMURAL LEIOMYOMA OF UTERUS: ICD-10-CM

## 2023-06-08 DIAGNOSIS — N95.0 POSTMENOPAUSAL BLEEDING: ICD-10-CM

## 2023-06-08 DIAGNOSIS — Z01.419 ENCNTR FOR GYN EXAM (GENERAL) (ROUTINE) W/O ABN FINDINGS: Primary | ICD-10-CM

## 2023-06-08 PROCEDURE — 99386 PREV VISIT NEW AGE 40-64: CPT | Performed by: OBSTETRICS & GYNECOLOGY

## 2023-06-08 PROCEDURE — G0145 SCR C/V CYTO,THINLAYER,RESCR: HCPCS | Performed by: OBSTETRICS & GYNECOLOGY

## 2023-06-08 PROCEDURE — G0476 HPV COMBO ASSAY CA SCREEN: HCPCS | Performed by: OBSTETRICS & GYNECOLOGY

## 2023-06-12 LAB
HPV HR 12 DNA CVX QL NAA+PROBE: NEGATIVE
HPV16 DNA CVX QL NAA+PROBE: NEGATIVE
HPV18 DNA CVX QL NAA+PROBE: NEGATIVE

## 2023-06-14 LAB
LAB AP GYN PRIMARY INTERPRETATION: NORMAL
Lab: NORMAL

## 2023-06-16 ENCOUNTER — HOSPITAL ENCOUNTER (OUTPATIENT)
Dept: ULTRASOUND IMAGING | Facility: HOSPITAL | Age: 58
End: 2023-06-16
Attending: OBSTETRICS & GYNECOLOGY
Payer: COMMERCIAL

## 2023-06-16 DIAGNOSIS — D25.1 INTRAMURAL LEIOMYOMA OF UTERUS: ICD-10-CM

## 2023-06-16 DIAGNOSIS — N95.0 POSTMENOPAUSAL BLEEDING: ICD-10-CM

## 2023-06-16 PROCEDURE — 76856 US EXAM PELVIC COMPLETE: CPT

## 2023-06-16 PROCEDURE — 76830 TRANSVAGINAL US NON-OB: CPT

## 2023-06-22 ENCOUNTER — TELEPHONE (OUTPATIENT)
Dept: OBGYN CLINIC | Facility: CLINIC | Age: 58
End: 2023-06-22

## 2023-06-26 DIAGNOSIS — I10 PRIMARY HYPERTENSION: ICD-10-CM

## 2023-06-26 RX ORDER — DILTIAZEM HYDROCHLORIDE 180 MG/1
360 CAPSULE, COATED, EXTENDED RELEASE ORAL DAILY
Qty: 60 CAPSULE | Refills: 3 | Status: SHIPPED | OUTPATIENT
Start: 2023-06-26

## 2023-07-05 NOTE — PATIENT INSTRUCTIONS
Endometrial Biopsy   WHAT YOU NEED TO KNOW:   Endometrial biopsy is a procedure to remove a tissue sample from the lining of your uterus. This procedure is done through your vagina. DISCHARGE INSTRUCTIONS:   Call your doctor or surgeon if:   You have severe pain that does not go away after you take pain medicine. You have a fever. You have pain or cramping that lasts longer than a few days. You have white or yellow vaginal discharge. You have more vaginal bleeding than you were told to expect. You have questions or concerns about your condition or care. Medicines:   NSAIDs , such as ibuprofen, help decrease swelling, pain, and fever. NSAIDs can cause stomach bleeding or kidney problems in certain people. If you take blood thinner medicine, always ask your healthcare provider if NSAIDs are safe for you. Always read the medicine label and follow directions. Take your medicine as directed. Contact your healthcare provider if you think your medicine is not helping or if you have side effects. Tell your provider if you are allergic to any medicine. Keep a list of the medicines, vitamins, and herbs you take. Include the amounts, and when and why you take them. Bring the list or the pill bottles to follow-up visits. Carry your medicine list with you in case of an emergency. Do not have sex, douche, or use a tampon  for at least 10 to 14 days. Follow up with your doctor or surgeon as directed:  Write down your questions so you remember to ask them during your visits. © Copyright [employer]lesia [employer] 2022 Information is for End User's use only and may not be sold, redistributed or otherwise used for commercial purposes. The above information is an  only. It is not intended as medical advice for individual conditions or treatments. Talk to your doctor, nurse or pharmacist before following any medical regimen to see if it is safe and effective for you.

## 2023-07-05 NOTE — PROGRESS NOTES
Assessment/Plan:  Problem List Items Addressed This Visit    None  Visit Diagnoses     Intramural leiomyoma of uterus    -  Primary    Postmenopausal bleeding             Discussed with patient that vaginal bleeding occurs in approximately 4-11% of postmenopausal patients. Discussed with patient possible etiologies. Differential diagnosis were discussed such as atrophy, endometrial hyperplasia, endometrial cancer, endometrial polyps uterine fibroids, adenomyosis, hormonal side effects (in women taking hormone therapy), anticoagulant therapy or infection. Hypoestrogenism can cause atrophy of the endometrium and vagina. Either endometrial biopsy or transvaginal pelvic ultrasound can be used as initial tests for evaluating the endometrium. Endometrial biopsy is initial diagnostic test for women with postmenopausal bleeding due to its high sensitivity, lower complication rate and low cost.      However, endometrial biopsy is not a sensitive technique for diagnosing structural abnormalities such as polyps or uterine fibroids. Endometrial biopsy is required of endometrial lining is thicker than 4 mm, there is diffuse or focal thickening, if the endometrium is not adequately visualized or if the patient has persistent postmenopausal bleeding. If the patient has insufficient or non-diagnostic tissue from endometrial biopsy procedure or if there is persistent bleeding then further diagnosis with hysteroscopy D&C would be recommended. Postmenopausal women, uterine bleeding is usually self-limited. Exclusion of cancer is the main objective and treatment is usually on necessary once cancer has been excluded. Discussed recommendations and inductions for endometrial sampling via endometrial biopsy in office. Discussed indications such as abnormal uterine bleeding, postmenopausal bleeding,  abnormal Pap,  monitoring for history of endometrial hyperplasia. I described the procedure in detail.    Discussed potential risks associated with procedure including cramping or pain, vasovagal reactions,   Rare complications include excessive uterine bleeding, uterine perforation, (0.1-1.3%) pelvic infection, bacteremia. Patient was instructed to call if with fever, cramping, continued abdominal pain, increasing pain, foul-smelling vaginal discharge or bleeding heavier than a normal period. We will call with results. Discussed with patient pelvic ultrasound results in detail. There is no evidence of intramural or any uterine fibroids. Discussed endometrial thickness is abnormal for postmenopausal patient. Advised patient to call if with persistent postmenopausal bleeding. Otherwise, follow-up next year for annual GYN exam.    Subjective   Patient ID: Ángel Heath is a 60 y.o. female. Patient is here for a problem visit. Chief Complaint   Patient presents with   • Procedure     EMB     Patient has been menopausal for several years. Patient noticed a small amount of vaginal bleeding in . Patient is not sexually active. Patient also reports a history of uterine fibroids    Menstrual History:  OB History        0    Para   0    Term   0       0    AB   0    Living   0       SAB   0    IAB   0    Ectopic   0    Multiple   0    Live Births   0                Menarche age:   No LMP recorded.  Patient is postmenopausal.         Past Medical History:   Diagnosis Date   • Atrial fibrillation ([address]) 10/2020       Past Surgical History:   Procedure Laterality Date   • BREAST BIOPSY Left     stereotactic bx - benign   • KNEE SURGERY Left 2023       Social History     Tobacco Use   • Smoking status: Never   • Smokeless tobacco: Never   Vaping Use   • Vaping Use: Never used   Substance Use Topics   • Alcohol use: Not Currently   • Drug use: Never        Allergies   Allergen Reactions   • Food Other (See Comments)     Wheat GI upset; joint inflammation   • Other Other (See Comments) ENVIRONMENTAL ALLERGENS. Pollen, dust, mold : sneezing  PLANTS. Sneezing  PET DANDER. Sneezing     • Penicillins Other (See Comments)     Family hx of allergy to penicillin   • Wheat Bran - Food Allergy Abdominal Pain   • Horse-Derived Products Rash     Horse serum         Current Outpatient Medications:   •  atorvastatin (LIPITOR) 10 mg tablet, Take 10 mg by mouth in the morning, Disp: , Rfl:   •  B Complex Vitamins (Vitamin B Complex) TABS, Take 2 tablets by mouth, Disp: , Rfl:   •  Barberry-Oreg Grape-Goldenseal 200-200-50 MG CAPS, Take 500 mg by mouth, Disp: , Rfl:   •  cholecalciferol (VITAMIN D3) 1,000 units tablet, Take 10,000 Units by mouth daily, Disp: , Rfl:   •  citalopram (CeleXA) 20 mg tablet, Take 0.5 tablets (10 mg total) by mouth in the morning, Disp: 45 tablet, Rfl: 1  •  co-enzyme Q-10 30 MG capsule, Take 100 mg by mouth 2 (two) times a day, Disp: , Rfl:   •  Digestive Enzymes (SIMILASE PO), Take 1 capsule by mouth 2 (two) times a day, Disp: , Rfl:   •  diltiazem (CARDIZEM CD) 180 mg 24 hr capsule, Take 2 capsules (360 mg total) by mouth daily, Disp: 60 capsule, Rfl: 3  •  Lactobacillus TABS, Take 1 tablet by mouth, Disp: , Rfl:   •  Magnesium 400 MG CAPS, Take by mouth, Disp: , Rfl:   •  Milk Thistle 500 MG CAPS, Take by mouth, Disp: , Rfl:   •  NP Thyroid 90 MG tablet, TAKE ONE TABLET BY MOUTH EVERY MORNING (Patient taking differently: Take 45 mg by mouth daily), Disp: 30 tablet, Rfl: 5  •  Omega-3 Fatty Acids (fish oil) 1,000 mg, Take 1,000 mg by mouth daily, Disp: , Rfl:       Review of Systems   Constitutional: Negative. HENT: Negative. Eyes: Negative. Respiratory: Negative. Cardiovascular: Negative. Gastrointestinal: Negative. Endocrine: Negative. Genitourinary:        As noted in HPI   Musculoskeletal: Negative. Skin: Negative. Allergic/Immunologic: Negative. Neurological: Negative. Hematological: Negative. Psychiatric/Behavioral: Negative.           BP 120/64 (BP Location: Right arm, Patient Position: Sitting, Cuff Size: Adult)   Pulse 64   Temp (!) 97.4 °F (36.3 °C) (Tympanic)   Ht 5' 6" (1.676 m)   Wt 113 kg (250 lb)   BMI 40.35 kg/m²       Physical Exam  Constitutional:       General: She is not in acute distress. Appearance: She is well-developed. Genitourinary:      Bladder and urethral meatus normal.      No lesions in the vagina. Right Labia: No rash, tenderness, lesions, skin changes or Bartholin's cyst.     Left Labia: No tenderness, lesions, skin changes, Bartholin's cyst or rash. No vaginal discharge, erythema, tenderness or bleeding. No vaginal prolapse present. No vaginal atrophy present. Right Adnexa: not tender, not full and no mass present. Left Adnexa: not tender, not full and no mass present. No cervical polyp. Uterus is not enlarged or tender. No uterine mass detected. Pelvic exam was performed with patient in the lithotomy position. Rectum:      No tenderness. Cardiovascular:      Rate and Rhythm: Normal rate. Pulmonary:      Effort: Pulmonary effort is normal.   Abdominal:      General: There is no distension. Palpations: Abdomen is soft. Tenderness: There is no abdominal tenderness. Neurological:      Mental Status: She is alert and oriented to person, place, and time. Skin:     General: Skin is warm and dry.    Psychiatric:         Behavior: Behavior normal.             Future Appointments   Date Time Provider [address] [place] [address]   7/6/2023 11:15 AM Kesha Cazares MD [facility]   9/19/2023  9:00 AM Pascual Lennox, CRNP [facility]   11/3/2023  8:30 AM [facility]   US pelvis complete w transvaginal  Status: Final result     PACS Images     Show images for US pelvis complete w transvaginal    US pelvis complete w transvaginal: Result Notes     Irene Gipson   6/22/2023  3:20 PM EDT       Patient is aware of results, her appointment is scheduled for July 6th. Zafar Watkins   6/22/2023  2:49 PM EDT       LVM for patient to call office. Jose Luis Rosas MD   6/22/2023  2:09 PM EDT       Please call patient with abnormal results. Johnanna Dubin endometrium does show thickened endometrium of 6 mm.  Endometrial biopsy is recommended and required which I believe she is scheduled for on July 6?  Please confirm            Study Result    Narrative & Impression   PELVIC ULTRASOUND, COMPLETE     INDICATION:  The patient is 60years old. D25.1: Intramural leiomyoma of uterus  N95.0: Postmenopausal bleeding.     COMPARISON: None     TECHNIQUE:   Transabdominal pelvic ultrasound was performed in sagittal and transverse planes with a curvilinear transducer. Additional transvaginal imaging was performed to better evaluate the endometrium and ovaries. Imaging included volumetric   sweeps as well as traditional still imaging technique.     FINDINGS:     UTERUS:  The uterus is anteverted in position, measuring 11.3 x 3.8 x 7.1 cm. The uterus has a normal contour and echotexture. The cervix appears within normal limits.     ENDOMETRIUM:  The endometrial echo complex has an AP caliber of 6.0 mm. This is slightly thickened for a postmenopausal patient with bleeding (the risk of carcinoma is ~7% if the endometrium is >5 mm and 0.07% if the endometrium is <5 mm, Bardales-bindman R, Corry Payne. How thick is too thick? When endometrial thickness should prompt biopsy in postmenopausal women without vaginal bleeding. Ultrasound Obstet Gynecol. 8058;45 (5): 558-65. doi:10.1002/uog.1704). No endometrial mass or fluid is seen.     OVARIES/ADNEXA:  Right ovary:  2.5 x 1.7 x 2.2 cm. 5.0 mL. Left ovary:  3.0 x 2.1 x 1.3 cm. 4.4 mL. Ovarian Doppler flow is within normal limits.   No suspicious ovarian or adnexal abnormality.     OTHER:  No free fluid or loculated fluid collections.        IMPRESSION:     Slightly thickened endometrial stripe measuring 6 mm. Recommend gynecological evaluation.     The study was marked in EPIC for significant notification.        Workstation performed: [id number]        Imaging    US pelvis complete w transvaginal (Order: [id number]) - 6/16/2023    Result History    US pelvis complete w transvaginal (Order #[id number]) on 6/22/2023 - Order Result History Report    Order Report     Order Details      Order Questions    Question Answer   Exam reason pelvic pain   Note: Enter reason for exam   Pregnant? No              Result Information    Status Priority Source   Final result (6/22/2023  2:06 PM) Routine        US pelvis complete w transvaginal: Result Notes     Selena Gamble   6/22/2023  3:20 PM EDT         Patient is aware of results, her appointment is scheduled for July 6th. Selena Gamble   6/22/2023  2:49 PM EDT         LVM for patient to call office.     Lorin Mitchell MD   6/22/2023  2:09 PM EDT         Please call patient with abnormal results. Fady Ledesma endometrium does show thickened endometrium of 6 mm.  Endometrial biopsy is recommended and required which I believe she is scheduled for on July 6?  Please confirm

## 2023-07-06 ENCOUNTER — PROCEDURE VISIT (OUTPATIENT)
Dept: OBGYN CLINIC | Facility: CLINIC | Age: 58
End: 2023-07-06

## 2023-07-06 VITALS
DIASTOLIC BLOOD PRESSURE: 64 MMHG | HEART RATE: 64 BPM | BODY MASS INDEX: 40.18 KG/M2 | SYSTOLIC BLOOD PRESSURE: 120 MMHG | TEMPERATURE: 97.4 F | WEIGHT: 250 LBS | HEIGHT: 66 IN

## 2023-07-06 DIAGNOSIS — N95.0 POSTMENOPAUSAL BLEEDING: Primary | ICD-10-CM

## 2023-07-06 DIAGNOSIS — D25.1 INTRAMURAL LEIOMYOMA OF UTERUS: ICD-10-CM

## 2023-07-06 DIAGNOSIS — R93.89 THICKENED ENDOMETRIUM: ICD-10-CM

## 2023-07-06 PROCEDURE — 58100 BIOPSY OF UTERUS LINING: CPT | Performed by: OBSTETRICS & GYNECOLOGY

## 2023-07-06 PROCEDURE — 88305 TISSUE EXAM BY PATHOLOGIST: CPT | Performed by: SPECIALIST

## 2023-07-06 PROCEDURE — 99213 OFFICE O/P EST LOW 20 MIN: CPT | Performed by: OBSTETRICS & GYNECOLOGY

## 2023-07-06 NOTE — PROGRESS NOTES
Endometrial biopsy    Date/Time: 7/6/2023 11:15 AM    Performed by: Glenna Capone MD  Authorized by: Glenna Capone MD  Universal Protocol:  Patient understanding: patient states understanding of the procedure being performed      Indication:     Indications: Post-menopausal bleeding    Procedure:     Procedure: endometrial biopsy with Pipelle      Cervix cleaned and prepped: yes      Local anesthetic:  Benzocaine spray    The cervix was dilated: yes      Uterus sounded: yes      Uterus sound depth (cm):  9    Curettes used:  1    Specimen collected: specimen collected and sent to pathology      Patient tolerated procedure well with no complications: yes

## 2023-07-10 PROCEDURE — 88305 TISSUE EXAM BY PATHOLOGIST: CPT | Performed by: SPECIALIST

## 2023-08-04 DIAGNOSIS — E78.2 MIXED HYPERLIPIDEMIA: Primary | ICD-10-CM

## 2023-08-04 RX ORDER — ATORVASTATIN CALCIUM 10 MG/1
10 TABLET, FILM COATED ORAL DAILY
Qty: 90 TABLET | Refills: 1 | Status: SHIPPED | OUTPATIENT
Start: 2023-08-04

## 2023-08-21 ENCOUNTER — OFFICE VISIT (OUTPATIENT)
Dept: OBGYN CLINIC | Facility: CLINIC | Age: 58
End: 2023-08-21

## 2023-08-21 VITALS
HEART RATE: 65 BPM | BODY MASS INDEX: 40.18 KG/M2 | WEIGHT: 250 LBS | DIASTOLIC BLOOD PRESSURE: 86 MMHG | SYSTOLIC BLOOD PRESSURE: 124 MMHG | HEIGHT: 66 IN

## 2023-08-21 DIAGNOSIS — R93.89 THICKENED ENDOMETRIUM: ICD-10-CM

## 2023-08-21 DIAGNOSIS — N95.0 POSTMENOPAUSAL BLEEDING: Primary | ICD-10-CM

## 2023-08-21 PROCEDURE — 99214 OFFICE O/P EST MOD 30 MIN: CPT | Performed by: OBSTETRICS & GYNECOLOGY

## 2023-08-21 RX ORDER — SODIUM CHLORIDE, SODIUM LACTATE, POTASSIUM CHLORIDE, CALCIUM CHLORIDE 600; 310; 30; 20 MG/100ML; MG/100ML; MG/100ML; MG/100ML
125 INJECTION, SOLUTION INTRAVENOUS CONTINUOUS
OUTPATIENT
Start: 2023-09-08

## 2023-08-21 NOTE — H&P (VIEW-ONLY)
Assessment/Plan:  Problem List Items Addressed This Visit    None  Visit Diagnoses     Postmenopausal bleeding    -  Primary    Relevant Orders    Case request operating room: DILATATION AND CURETTAGE (D&C) WITH HYSTEROSCOPY (Completed)    CBC and Platelet    Type and screen    Basic metabolic panel    Thickened endometrium        Relevant Orders    Case request operating room: DILATATION AND CURETTAGE (D&C) WITH HYSTEROSCOPY (Completed)    CBC and Platelet    Type and screen    Basic metabolic panel           Patient with persistent postmenopausal bleeding. Her previous endometrial biopsy showed insufficient cells. Due to persistent postmenopausal bleeding and thickened endometrium, I recommended hysteroscopy D&C. Discussed with patient associated risks with hysteroscopy noting uterine perforation and fluid overload. Discussed with patient alternatives including no intervention as well as trial of repeat endometrial biopsy. Due to the fact that if with persistent postmenopausal bleeding that D&C would be recommended, we did schedule hysteroscopy and D&C. Discussed with patient indication, risks, benefits and alternatives of surgical exploration. . She agrees and wants to proceed. Informed consent form signed    Preadmission testing labs, chlorhexidine wash or given. Follow-up 2 weeks postop        Subjective  Patient ID: Wendy Contreras is a 60 y.o. female. Patient is here for a problem visit. Chief Complaint   Patient presents with   • Gynecologic Exam     Patient had cramping, slightly more bleeding. Was bright and red. During the rest of the day it subsided. Was to f/u if that occurred. She had some spotting in . She has had cramping and now blood and mucus last week with bright red bleeding. She is not sexually active.         Menstrual History:  OB History        0    Para   0    Term   0       0    AB   0    Living   0       SAB   0    IAB   0    Ectopic   0 Multiple   0    Live Births   0                Menarche age: 14  No LMP recorded. Patient is postmenopausal.     Menopause: 45    Past Medical History:   Diagnosis Date   • Atrial fibrillation ([address]) 10/2020       Past Surgical History:   Procedure Laterality Date   • BREAST BIOPSY Left 2012    stereotactic bx - benign   • KNEE SURGERY Left 02/17/2023       Social History     Tobacco Use   • Smoking status: Never   • Smokeless tobacco: Never   Vaping Use   • Vaping Use: Never used   Substance Use Topics   • Alcohol use: Not Currently   • Drug use: Never        Allergies   Allergen Reactions   • Food Other (See Comments)     Wheat GI upset; joint inflammation   • Other Other (See Comments)     ENVIRONMENTAL ALLERGENS. Pollen, dust, mold : sneezing  PLANTS. Sneezing  PET DANDER.  Sneezing     • Penicillins Other (See Comments)     Family hx of allergy to penicillin   • Wheat Bran - Food Allergy Abdominal Pain   • Horse-Derived Products Rash     Horse serum         Current Outpatient Medications:   •  atorvastatin (LIPITOR) 10 mg tablet, Take 1 tablet (10 mg total) by mouth in the morning, Disp: 90 tablet, Rfl: 1  •  B Complex Vitamins (Vitamin B Complex) TABS, Take 2 tablets by mouth, Disp: , Rfl:   •  Barberry-Oreg Grape-Goldenseal 200-200-50 MG CAPS, Take 500 mg by mouth, Disp: , Rfl:   •  cholecalciferol (VITAMIN D3) 1,000 units tablet, Take 10,000 Units by mouth daily, Disp: , Rfl:   •  citalopram (CeleXA) 20 mg tablet, Take 0.5 tablets (10 mg total) by mouth in the morning, Disp: 45 tablet, Rfl: 1  •  co-enzyme Q-10 30 MG capsule, Take 100 mg by mouth 2 (two) times a day, Disp: , Rfl:   •  Digestive Enzymes (SIMILASE PO), Take 1 capsule by mouth 2 (two) times a day, Disp: , Rfl:   •  diltiazem (CARDIZEM CD) 180 mg 24 hr capsule, Take 2 capsules (360 mg total) by mouth daily, Disp: 60 capsule, Rfl: 3  •  Lactobacillus TABS, Take 1 tablet by mouth, Disp: , Rfl:   •  Magnesium 400 MG CAPS, Take by mouth, Disp: , Rfl: •  Milk Thistle 500 MG CAPS, Take by mouth, Disp: , Rfl:   •  NP Thyroid 90 MG tablet, TAKE ONE TABLET BY MOUTH EVERY MORNING (Patient taking differently: Take 45 mg by mouth daily), Disp: 30 tablet, Rfl: 5  •  Omega-3 Fatty Acids (fish oil) 1,000 mg, Take 1,000 mg by mouth daily, Disp: , Rfl:       Review of Systems   Constitutional: Negative. HENT: Negative. Eyes: Negative. Respiratory: Negative. Cardiovascular: Negative. Gastrointestinal: Negative. Endocrine: Negative. Genitourinary:        As noted in HPI   Musculoskeletal: Negative. Skin: Negative. Allergic/Immunologic: Negative. Neurological: Negative. Hematological: Negative. Psychiatric/Behavioral: Negative. /86 (BP Location: Right arm, Patient Position: Sitting, Cuff Size: Large)   Pulse 65   Ht 5' 6" (1.676 m)   Wt 113 kg (250 lb)   BMI 40.35 kg/m²       Physical Exam  Constitutional:       Appearance: She is well-developed. Genitourinary:      No vaginal discharge or bleeding. Right Adnexa: not tender and no mass present. Left Adnexa: not tender and no mass present. No cervical motion tenderness. Uterus is not enlarged or tender. Neck:      Thyroid: No thyromegaly. Cardiovascular:      Rate and Rhythm: Normal rate and regular rhythm. Heart sounds: Normal heart sounds. Pulmonary:      Effort: Pulmonary effort is normal.      Breath sounds: Normal breath sounds. Abdominal:      General: There is no distension. Palpations: Abdomen is soft. Tenderness: There is no abdominal tenderness. Musculoskeletal:         General: No tenderness. Neurological:      Mental Status: She is alert and oriented to person, place, and time. Skin:     General: Skin is warm and dry.                  US pelvis complete w transvaginal  Status: Final result     PACS Images     Show images for US pelvis complete w transvaginal    US pelvis complete w transvaginal: Result Notes Nina Pretty   6/22/2023  3:20 PM EDT       Patient is aware of results, her appointment is scheduled for July 6th. Nina Pretty   6/22/2023  2:49 PM EDT       LVM for patient to call office. Jose L John MD   6/22/2023  2:09 PM EDT       Please call patient with abnormal results. Royal Kerns endometrium does show thickened endometrium of 6 mm.  Endometrial biopsy is recommended and required which I believe she is scheduled for on July 6?  Please confirm            Study Result    Narrative & Impression   PELVIC ULTRASOUND, COMPLETE     INDICATION:  The patient is 60years old. D25.1: Intramural leiomyoma of uterus  N95.0: Postmenopausal bleeding.     COMPARISON: None     TECHNIQUE:   Transabdominal pelvic ultrasound was performed in sagittal and transverse planes with a curvilinear transducer. Additional transvaginal imaging was performed to better evaluate the endometrium and ovaries. Imaging included volumetric   sweeps as well as traditional still imaging technique.     FINDINGS:     UTERUS:  The uterus is anteverted in position, measuring 11.3 x 3.8 x 7.1 cm. The uterus has a normal contour and echotexture. The cervix appears within normal limits.     ENDOMETRIUM:  The endometrial echo complex has an AP caliber of 6.0 mm. This is slightly thickened for a postmenopausal patient with bleeding (the risk of carcinoma is ~7% if the endometrium is >5 mm and 0.07% if the endometrium is <5 mm, Cortezman KEVYN, Mehran Chery. How thick is too thick? When endometrial thickness should prompt biopsy in postmenopausal women without vaginal bleeding. Ultrasound Obstet Gynecol. 1029;18 (5): 558-65. doi:10.1002/uog.1704). No endometrial mass or fluid is seen.     OVARIES/ADNEXA:  Right ovary:  2.5 x 1.7 x 2.2 cm. 5.0 mL. Left ovary:  3.0 x 2.1 x 1.3 cm. 4.4 mL. Ovarian Doppler flow is within normal limits.   No suspicious ovarian or adnexal abnormality.     OTHER:  No free fluid or loculated fluid collections.        IMPRESSION:     Slightly thickened endometrial stripe measuring 6 mm. Recommend gynecological evaluation.     The study was marked in [facility] for significant notification.        Workstation performed: [id number]        Imaging    US pelvis complete w transvaginal (Order: [id number]) - 6/16/2023    Result History    US pelvis complete w transvaginal (Order #[id number]) on 6/22/2023 - Order Result History Report    Order Report     Order Details      Order Questions    Question Answer   Exam reason pelvic pain   Note: Enter reason for exam   Pregnant? No     Tissue Exam: [id number]  Order: [id number]   Collected 7/6/2023 11:21 AM      Status: Final result      Visible to patient: Yes (not seen)      Dx: Postmenopausal bleeding; Intramural l. ..      2 Result Notes      Component    Case Report   Surgical Pathology Report                         Case: [id number]                                    Authorizing Provider: Jael Johns MD           Collected:           07/06/2023 1121               Ordering Location:     [facility]  Received:            07/06/2023 1121                                      [address] [facility]                                                           Pathologist:           Wiley Wray MD                                                      Specimen:    Endometrium, EMB                                                                           Final Diagnosis   A. Endometrium, EMB:   -  Scant strips of inactive endometrium.   -  No endometrial stroma to evaluate for hyperplasia. -  Scant detached strips of endocervical tissue.       Note: The biopsy is superficial and fragmented. While no malignancy is identified, clinical correlation is required to ensure adequate endometrial sampling.     Electronically signed by Wiley Wray MD on 7/10/2023 at  1:13 PM   Additional Information    All reported additional testing was performed with appropriately reactive controls.  These tests were developed and their performance characteristics determined by [facility] or appropriate performing facility, though some tests may be performed on tissues which have not been validated for performance characteristics (such as staining performed on alcohol exposed cell blocks and decalcified tissues).  Results should be interpreted with caution and in the context of the patients’ clinical condition. These tests may not be cleared or approved by the U.S. Food and Drug Administration, though the FDA has determined that such clearance or approval is not necessary. These tests are used for clinical purposes and they should not be regarded as investigational or for research. This laboratory has been approved by CLIA 88, designated as a high-complexity laboratory and is qualified to perform these tests. Interpretation performed at [facility], [address] [facility] [address] [facility] [place]. Gross Description     A. The specimen is received in formalin, labeled with the patient's name and hospital number, and is designated " EMB". The specimen consists of multiple tan-pink soft tissue fragments measuring in aggregate 0.7 x 0.7 by less than 0.1 cm. Also submitted in the container is mucinous material.  Due to the size and consistency of the specimen, the specimen may not survive histological processing. Entirely submitted. One cassette, in an embedding bag.     Note: The estimated total formalin fixation time based upon information provided by the submitting clinician and the standard processing schedule is under 72 hours.   MSequino   Resulting Agency [facility]              Specimen Collected: 07/06/23 11:21 AM Last Resulted: 07/10/23  1:13 PM        Order Details      View Encounter      Lab and Collection Details      Routing      Result History     View All Conversations on this Encounter           Scans on Order [id number]    Lab Result Document - Document on 7/10/2023  1:13 PM         Result Care Coordination      Result Notes     Rachell Glass   7/13/2023  8:32 AM EDT Back to Top      Patient was informed of test results, states she will monitor it for now and if any bleeding starts she will call office for another appointment.     Abdon Sargent MD   7/12/2023 11:21 PM EDT       Please call the patient regarding her biopsy. Alexi Jean endometrium is technically benign but there was a very small amount of tissue obtained.  There was inadequate sampling to truly determine if there were any precancerous changes.  If her bleeding recurs, we can consider repeat sampling of the endometrium or proceeding with additional testing via hysteroscopy and D&C.  If her bleeding does not recur, no additional testing is needed for       Patient Communication     Add Comments   Add Notifications  Back to Top           Authorizing Provider Information    Name: Abdon Sargent MD Fax: [phone]   Phone: [phone] Pager:      PDF Results and Scans    Document on 7/10/2023  1:13 PM by Virgilio Cabrera MD            Lab and Collection    Tissue Exam (Order: [id number]) - 7/6/2023          Future Appointments   Date Time Provider 4600  [address]   9/19/2023  9:00 AM PIERRE Almanza [facility]   9/21/2023 10:30 AM Abdon Sargent MD [facility]   11/3/2023  8:30 AM [facility]

## 2023-08-21 NOTE — H&P
Assessment/Plan:  Problem List Items Addressed This Visit    None  Visit Diagnoses     Postmenopausal bleeding    -  Primary    Relevant Orders    Case request operating room: DILATATION AND CURETTAGE (D&C) WITH HYSTEROSCOPY (Completed)    CBC and Platelet    Type and screen    Basic metabolic panel    Thickened endometrium        Relevant Orders    Case request operating room: DILATATION AND CURETTAGE (D&C) WITH HYSTEROSCOPY (Completed)    CBC and Platelet    Type and screen    Basic metabolic panel           Patient with persistent postmenopausal bleeding. Her previous endometrial biopsy showed insufficient cells. Due to persistent postmenopausal bleeding and thickened endometrium, I recommended hysteroscopy D&C. Discussed with patient associated risks with hysteroscopy noting uterine perforation and fluid overload. Discussed with patient alternatives including no intervention as well as trial of repeat endometrial biopsy. Due to the fact that if with persistent postmenopausal bleeding that D&C would be recommended, we did schedule hysteroscopy and D&C. Discussed with patient indication, risks, benefits and alternatives of surgical exploration. . She agrees and wants to proceed. Informed consent form signed    Preadmission testing labs, chlorhexidine wash or given. Follow-up 2 weeks postop        Subjective  Patient ID: Jonathan Newman is a 60 y.o. female. Patient is here for a problem visit. Chief Complaint   Patient presents with   • Gynecologic Exam     Patient had cramping, slightly more bleeding. Was bright and red. During the rest of the day it subsided. Was to f/u if that occurred. She had some spotting in . She has had cramping and now blood and mucus last week with bright red bleeding. She is not sexually active.         Menstrual History:  OB History        0    Para   0    Term   0       0    AB   0    Living   0       SAB   0    IAB   0    Ectopic   0 Multiple   0    Live Births   0                Menarche age: 14  No LMP recorded. Patient is postmenopausal.     Menopause: 45    Past Medical History:   Diagnosis Date   • Atrial fibrillation ([address]) 10/2020       Past Surgical History:   Procedure Laterality Date   • BREAST BIOPSY Left 2012    stereotactic bx - benign   • KNEE SURGERY Left 02/17/2023       Social History     Tobacco Use   • Smoking status: Never   • Smokeless tobacco: Never   Vaping Use   • Vaping Use: Never used   Substance Use Topics   • Alcohol use: Not Currently   • Drug use: Never        Allergies   Allergen Reactions   • Food Other (See Comments)     Wheat GI upset; joint inflammation   • Other Other (See Comments)     ENVIRONMENTAL ALLERGENS. Pollen, dust, mold : sneezing  PLANTS. Sneezing  PET DANDER.  Sneezing     • Penicillins Other (See Comments)     Family hx of allergy to penicillin   • Wheat Bran - Food Allergy Abdominal Pain   • Horse-Derived Products Rash     Horse serum         Current Outpatient Medications:   •  atorvastatin (LIPITOR) 10 mg tablet, Take 1 tablet (10 mg total) by mouth in the morning, Disp: 90 tablet, Rfl: 1  •  B Complex Vitamins (Vitamin B Complex) TABS, Take 2 tablets by mouth, Disp: , Rfl:   •  Barberry-Oreg Grape-Goldenseal 200-200-50 MG CAPS, Take 500 mg by mouth, Disp: , Rfl:   •  cholecalciferol (VITAMIN D3) 1,000 units tablet, Take 10,000 Units by mouth daily, Disp: , Rfl:   •  citalopram (CeleXA) 20 mg tablet, Take 0.5 tablets (10 mg total) by mouth in the morning, Disp: 45 tablet, Rfl: 1  •  co-enzyme Q-10 30 MG capsule, Take 100 mg by mouth 2 (two) times a day, Disp: , Rfl:   •  Digestive Enzymes (SIMILASE PO), Take 1 capsule by mouth 2 (two) times a day, Disp: , Rfl:   •  diltiazem (CARDIZEM CD) 180 mg 24 hr capsule, Take 2 capsules (360 mg total) by mouth daily, Disp: 60 capsule, Rfl: 3  •  Lactobacillus TABS, Take 1 tablet by mouth, Disp: , Rfl:   •  Magnesium 400 MG CAPS, Take by mouth, Disp: , Rfl: •  Milk Thistle 500 MG CAPS, Take by mouth, Disp: , Rfl:   •  NP Thyroid 90 MG tablet, TAKE ONE TABLET BY MOUTH EVERY MORNING (Patient taking differently: Take 45 mg by mouth daily), Disp: 30 tablet, Rfl: 5  •  Omega-3 Fatty Acids (fish oil) 1,000 mg, Take 1,000 mg by mouth daily, Disp: , Rfl:       Review of Systems   Constitutional: Negative. HENT: Negative. Eyes: Negative. Respiratory: Negative. Cardiovascular: Negative. Gastrointestinal: Negative. Endocrine: Negative. Genitourinary:        As noted in HPI   Musculoskeletal: Negative. Skin: Negative. Allergic/Immunologic: Negative. Neurological: Negative. Hematological: Negative. Psychiatric/Behavioral: Negative. /86 (BP Location: Right arm, Patient Position: Sitting, Cuff Size: Large)   Pulse 65   Ht 5' 6" (1.676 m)   Wt 113 kg (250 lb)   BMI 40.35 kg/m²       Physical Exam  Constitutional:       Appearance: She is well-developed. Genitourinary:      No vaginal discharge or bleeding. Right Adnexa: not tender and no mass present. Left Adnexa: not tender and no mass present. No cervical motion tenderness. Uterus is not enlarged or tender. Neck:      Thyroid: No thyromegaly. Cardiovascular:      Rate and Rhythm: Normal rate and regular rhythm. Heart sounds: Normal heart sounds. Pulmonary:      Effort: Pulmonary effort is normal.      Breath sounds: Normal breath sounds. Abdominal:      General: There is no distension. Palpations: Abdomen is soft. Tenderness: There is no abdominal tenderness. Musculoskeletal:         General: No tenderness. Neurological:      Mental Status: She is alert and oriented to person, place, and time. Skin:     General: Skin is warm and dry.                  US pelvis complete w transvaginal  Status: Final result     PACS Images     Show images for US pelvis complete w transvaginal    US pelvis complete w transvaginal: Result Notes Jennifer Berger   6/22/2023  3:20 PM EDT       Patient is aware of results, her appointment is scheduled for July 6th. Jennifer Berger   6/22/2023  2:49 PM EDT       LVM for patient to call office. Ale Gaspar MD   6/22/2023  2:09 PM EDT       Please call patient with abnormal results. Manuelito Riley endometrium does show thickened endometrium of 6 mm.  Endometrial biopsy is recommended and required which I believe she is scheduled for on July 6?  Please confirm            Study Result    Narrative & Impression   PELVIC ULTRASOUND, COMPLETE     INDICATION:  The patient is 60years old. D25.1: Intramural leiomyoma of uterus  N95.0: Postmenopausal bleeding.     COMPARISON: None     TECHNIQUE:   Transabdominal pelvic ultrasound was performed in sagittal and transverse planes with a curvilinear transducer. Additional transvaginal imaging was performed to better evaluate the endometrium and ovaries. Imaging included volumetric   sweeps as well as traditional still imaging technique.     FINDINGS:     UTERUS:  The uterus is anteverted in position, measuring 11.3 x 3.8 x 7.1 cm. The uterus has a normal contour and echotexture. The cervix appears within normal limits.     ENDOMETRIUM:  The endometrial echo complex has an AP caliber of 6.0 mm. This is slightly thickened for a postmenopausal patient with bleeding (the risk of carcinoma is ~7% if the endometrium is >5 mm and 0.07% if the endometrium is <5 mm, Bardales-bindman R, Tiffany Santana. How thick is too thick? When endometrial thickness should prompt biopsy in postmenopausal women without vaginal bleeding. Ultrasound Obstet Gynecol. 7414;34 (5): 558-65. doi:10.1002/uog.1704). No endometrial mass or fluid is seen.     OVARIES/ADNEXA:  Right ovary:  2.5 x 1.7 x 2.2 cm. 5.0 mL. Left ovary:  3.0 x 2.1 x 1.3 cm. 4.4 mL. Ovarian Doppler flow is within normal limits.   No suspicious ovarian or adnexal abnormality.     OTHER:  No free fluid or loculated fluid collections.        IMPRESSION:     Slightly thickened endometrial stripe measuring 6 mm. Recommend gynecological evaluation.     The study was marked in [facility] for significant notification.        Workstation performed: [id number]        Imaging    US pelvis complete w transvaginal (Order: [id number]) - 6/16/2023    Result History    US pelvis complete w transvaginal (Order #[id number]) on 6/22/2023 - Order Result History Report    Order Report     Order Details      Order Questions    Question Answer   Exam reason pelvic pain   Note: Enter reason for exam   Pregnant? No     Tissue Exam: [id number]  Order: [id number]   Collected 7/6/2023 11:21 AM      Status: Final result      Visible to patient: Yes (not seen)      Dx: Postmenopausal bleeding; Intramural l. ..      2 Result Notes      Component    Case Report   Surgical Pathology Report                         Case: [id number]                                    Authorizing Provider: Devon Luu MD           Collected:           07/06/2023 1121               Ordering Location:     [facility]  Received:            07/06/2023 1121                                      [address] [facility]                                                           Pathologist:           Argentina Hardin MD                                                      Specimen:    Endometrium, EMB                                                                           Final Diagnosis   A. Endometrium, EMB:   -  Scant strips of inactive endometrium.   -  No endometrial stroma to evaluate for hyperplasia. -  Scant detached strips of endocervical tissue.       Note: The biopsy is superficial and fragmented. While no malignancy is identified, clinical correlation is required to ensure adequate endometrial sampling.     Electronically signed by Argentina Hardin MD on 7/10/2023 at  1:13 PM   Additional Information    All reported additional testing was performed with appropriately reactive controls.  These tests were developed and their performance characteristics determined by [facility] or appropriate performing facility, though some tests may be performed on tissues which have not been validated for performance characteristics (such as staining performed on alcohol exposed cell blocks and decalcified tissues).  Results should be interpreted with caution and in the context of the patients’ clinical condition. These tests may not be cleared or approved by the U.S. Food and Drug Administration, though the FDA has determined that such clearance or approval is not necessary. These tests are used for clinical purposes and they should not be regarded as investigational or for research. This laboratory has been approved by [facility], designated as a high-complexity laboratory and is qualified to perform these tests. Interpretation performed at [facility], [address] [facility] [address] [facility]. Gross Description     A. The specimen is received in formalin, labeled with the patient's name and hospital number, and is designated " EMB". The specimen consists of multiple tan-pink soft tissue fragments measuring in aggregate 0.7 x 0.7 by less than 0.1 cm. Also submitted in the container is mucinous material.  Due to the size and consistency of the specimen, the specimen may not survive histological processing. Entirely submitted. One cassette, in an embedding bag.     Note: The estimated total formalin fixation time based upon information provided by the submitting clinician and the standard processing schedule is under 72 hours.   MSequino   Resulting Agency [facility]              Specimen Collected: 07/06/23 11:21 AM Last Resulted: 07/10/23  1:13 PM        Order Details      View Encounter      Lab and Collection Details      Routing      Result History     View All Conversations on this Encounter           Scans on Order [id number]    Lab Result Document - Document on 7/10/2023  1:13 PM         Result Care Coordination      Result Notes     Ingrid Robledo   7/13/2023  8:32 AM EDT Back to Top      Patient was informed of test results, states she will monitor it for now and if any bleeding starts she will call office for another appointment.     Neal Joseph MD   7/12/2023 11:21 PM EDT       Please call the patient regarding her biopsy. Ha  endometrium is technically benign but there was a very small amount of tissue obtained.  There was inadequate sampling to truly determine if there were any precancerous changes.  If her bleeding recurs, we can consider repeat sampling of the endometrium or proceeding with additional testing via hysteroscopy and D&C.  If her bleeding does not recur, no additional testing is needed for       Patient Communication     Add Comments   Add Notifications  Back to Top           Authorizing Provider Information    Name: Neal Joseph MD Fax: [phone]   Phone: [phone] Pager:      PDF Results and Scans    Document on 7/10/2023  1:13 PM by Isabel Maria MD            Lab and Collection    Tissue Exam (Order: [id number]) - 7/6/2023          Future Appointments   Date Time Provider [address]   9/19/2023  9:00 AM PIERRE Zuleta [facility]   9/21/2023 10:30 AM Neal Joseph MD Complete  [facility]   11/3/2023  8:30 AM [facility]

## 2023-08-22 ENCOUNTER — APPOINTMENT (OUTPATIENT)
Dept: LAB | Facility: CLINIC | Age: 58
End: 2023-08-22

## 2023-08-22 DIAGNOSIS — N95.0 POSTMENOPAUSAL BLEEDING: ICD-10-CM

## 2023-08-22 DIAGNOSIS — R93.89 THICKENED ENDOMETRIUM: ICD-10-CM

## 2023-08-22 LAB
ANION GAP SERPL CALCULATED.3IONS-SCNC: 3 MMOL/L
BUN SERPL-MCNC: 20 MG/DL (ref 5–25)
CALCIUM SERPL-MCNC: 9.5 MG/DL (ref 8.4–10.2)
CHLORIDE SERPL-SCNC: 107 MMOL/L (ref 96–108)
CO2 SERPL-SCNC: 30 MMOL/L (ref 21–32)
CREAT SERPL-MCNC: 0.75 MG/DL (ref 0.6–1.3)
ERYTHROCYTE [DISTWIDTH] IN BLOOD BY AUTOMATED COUNT: 15.2 % (ref 11.6–15.1)
GFR SERPL CREATININE-BSD FRML MDRD: 87 ML/MIN/1.73SQ M
GLUCOSE P FAST SERPL-MCNC: 92 MG/DL (ref 65–99)
HCT VFR BLD AUTO: 42.7 % (ref 34.8–46.1)
HGB BLD-MCNC: 13.8 G/DL (ref 11.5–15.4)
MCH RBC QN AUTO: 27.2 PG (ref 26.8–34.3)
MCHC RBC AUTO-ENTMCNC: 32.3 G/DL (ref 31.4–37.4)
MCV RBC AUTO: 84 FL (ref 82–98)
PLATELET # BLD AUTO: 320 THOUSANDS/UL (ref 149–390)
PMV BLD AUTO: 10.2 FL (ref 8.9–12.7)
POTASSIUM SERPL-SCNC: 4.8 MMOL/L (ref 3.5–5.3)
RBC # BLD AUTO: 5.07 MILLION/UL (ref 3.81–5.12)
SODIUM SERPL-SCNC: 140 MMOL/L (ref 135–147)
WBC # BLD AUTO: 4.83 THOUSAND/UL (ref 4.31–10.16)

## 2023-08-22 PROCEDURE — 80048 BASIC METABOLIC PNL TOTAL CA: CPT

## 2023-08-22 PROCEDURE — 86900 BLOOD TYPING SEROLOGIC ABO: CPT

## 2023-08-22 PROCEDURE — 86850 RBC ANTIBODY SCREEN: CPT

## 2023-08-22 PROCEDURE — 86901 BLOOD TYPING SEROLOGIC RH(D): CPT

## 2023-08-22 PROCEDURE — 85027 COMPLETE CBC AUTOMATED: CPT

## 2023-08-22 PROCEDURE — 36415 COLL VENOUS BLD VENIPUNCTURE: CPT

## 2023-08-24 LAB
ABO GROUP BLD: NORMAL
BLD GP AB SCN SERPL QL: NEGATIVE
RH BLD: NEGATIVE
SPECIMEN EXPIRATION DATE: NORMAL

## 2023-08-29 ENCOUNTER — ANESTHESIA EVENT (OUTPATIENT)
Dept: PERIOP | Facility: HOSPITAL | Age: 58
End: 2023-08-29

## 2023-08-30 NOTE — PRE-PROCEDURE INSTRUCTIONS
Pre-Surgery Instructions:   Medication Instructions   • atorvastatin (LIPITOR) 10 mg tablet Take night before surgery   • B Complex Vitamins (Vitamin B Complex) TABS Stop taking 7 days prior to surgery. • Barberry-Oreg Grape-Goldenseal 200-200-50 MG CAPS Stop taking 7 days prior to surgery. • cholecalciferol (VITAMIN D3) 1,000 units tablet Take night before surgery   • citalopram (CeleXA) 20 mg tablet Stop taking 7 days prior to surgery. • co-enzyme Q-10 30 MG capsule Stop taking 7 days prior to surgery. • Digestive Enzymes (SIMILASE PO) Stop taking 7 days prior to surgery. • diltiazem (CARDIZEM CD) 180 mg 24 hr capsule Take day of surgery. • Lactobacillus TABS Stop taking 7 days prior to surgery. • Magnesium 400 MG CAPS Stop taking 7 days prior to surgery. • Milk Thistle 500 MG CAPS Stop taking 7 days prior to surgery. • NP Thyroid 90 MG tablet Take day of surgery. • Omega-3 Fatty Acids (fish oil) 1,000 mg Stop taking 7 days prior to surgery. Medication instructions for day surgery reviewed. Please use only a sip of water to take your instructed medications. Avoid all over the counter vitamins, supplements and NSAIDS for one week prior to surgery per anesthesia guidelines. Tylenol is ok to take as needed. You will receive a call one business day prior to surgery with an arrival time and hospital directions. If your surgery is scheduled on a Monday, the hospital will be calling you on the Friday prior to your surgery. If you have not heard from anyone by 8pm, please call the hospital supervisor through the hospital  at [phone]. Kavitha Bullock [phone]). Do not eat or drink anything after midnight the night before your surgery, including candy, mints, lifesavers, or chewing gum. Do not drink alcohol 24hrs before your surgery. Try not to smoke at least 24hrs before your surgery.        Follow the pre surgery showering instructions as listed in the [facility] Surgical Experience Booklet” or otherwise provided by your surgeon's office. Do not shave the surgical area 24 hours before surgery. Do not apply any lotions, creams, including makeup, cologne, deodorant, or perfumes after showering on the day of your surgery. No contact lenses, eye make-up, or artificial eyelashes. Remove nail polish, including gel polish, and any artificial, gel, or acrylic nails if possible. Remove all jewelry including rings and body piercing jewelry. Wear causal clothing that is easy to take on and off. Consider your type of surgery. Keep any valuables, jewelry, piercings at home. Please bring any specially ordered equipment (sling, braces) if indicated. Arrange for a responsible person to drive you to and from the hospital on the day of your surgery. Visitor Guidelines discussed. Call the surgeon's office with any new illnesses, exposures, or additional questions prior to surgery. Please reference your [facility] Surgical Experience Booklet” for additional information to prepare for your upcoming surgery.

## 2023-09-03 DIAGNOSIS — F41.9 ANXIETY: ICD-10-CM

## 2023-09-04 RX ORDER — CITALOPRAM 20 MG/1
10 TABLET ORAL DAILY
Qty: 45 TABLET | Refills: 0 | Status: SHIPPED | OUTPATIENT
Start: 2023-09-04

## 2023-09-07 PROBLEM — Z98.890 S/P DILATION AND CURETTAGE: Status: ACTIVE | Noted: 2023-09-07

## 2023-09-07 PROBLEM — R93.89 THICKENED ENDOMETRIUM: Status: ACTIVE | Noted: 2023-09-07

## 2023-09-07 PROBLEM — N95.0 POSTMENOPAUSAL BLEEDING: Status: ACTIVE | Noted: 2023-09-07

## 2023-09-08 ENCOUNTER — HOSPITAL ENCOUNTER (OUTPATIENT)
Facility: HOSPITAL | Age: 58
Setting detail: OUTPATIENT SURGERY
Discharge: HOME | End: 2023-09-08
Attending: OBSTETRICS & GYNECOLOGY | Admitting: OBSTETRICS & GYNECOLOGY

## 2023-09-08 ENCOUNTER — ANESTHESIA (OUTPATIENT)
Dept: PERIOP | Facility: HOSPITAL | Age: 58
End: 2023-09-08

## 2023-09-08 VITALS
HEIGHT: 66 IN | SYSTOLIC BLOOD PRESSURE: 141 MMHG | RESPIRATION RATE: 18 BRPM | DIASTOLIC BLOOD PRESSURE: 87 MMHG | WEIGHT: 247.14 LBS | HEART RATE: 61 BPM | BODY MASS INDEX: 39.72 KG/M2 | TEMPERATURE: 97.3 F | OXYGEN SATURATION: 97 %

## 2023-09-08 DIAGNOSIS — N95.0 POSTMENOPAUSAL BLEEDING: ICD-10-CM

## 2023-09-08 DIAGNOSIS — R93.89 THICKENED ENDOMETRIUM: ICD-10-CM

## 2023-09-08 PROBLEM — I48.91 ATRIAL FIBRILLATION (HCC): Status: ACTIVE | Noted: 2020-10-21

## 2023-09-08 PROBLEM — E66.9 CLASS 2 OBESITY WITHOUT SERIOUS COMORBIDITY IN ADULT: Status: ACTIVE | Noted: 2023-09-08

## 2023-09-08 PROBLEM — E66.812 CLASS 2 OBESITY WITHOUT SERIOUS COMORBIDITY IN ADULT: Status: ACTIVE | Noted: 2023-09-08

## 2023-09-08 LAB
ABO GROUP BLD: NORMAL
RH BLD: NEGATIVE

## 2023-09-08 PROCEDURE — 58558 HYSTEROSCOPY BIOPSY: CPT | Performed by: OBSTETRICS & GYNECOLOGY

## 2023-09-08 PROCEDURE — 88305 TISSUE EXAM BY PATHOLOGIST: CPT | Performed by: PATHOLOGY

## 2023-09-08 RX ORDER — ONDANSETRON 2 MG/ML
4 INJECTION INTRAMUSCULAR; INTRAVENOUS EVERY 6 HOURS PRN
Status: DISCONTINUED | OUTPATIENT
Start: 2023-09-08 | End: 2023-09-08 | Stop reason: HOSPADM

## 2023-09-08 RX ORDER — EPHEDRINE SULFATE 50 MG/ML
INJECTION INTRAVENOUS AS NEEDED
Status: DISCONTINUED | OUTPATIENT
Start: 2023-09-08 | End: 2023-09-08

## 2023-09-08 RX ORDER — ONDANSETRON 2 MG/ML
INJECTION INTRAMUSCULAR; INTRAVENOUS AS NEEDED
Status: DISCONTINUED | OUTPATIENT
Start: 2023-09-08 | End: 2023-09-08

## 2023-09-08 RX ORDER — MAGNESIUM HYDROXIDE 1200 MG/15ML
LIQUID ORAL AS NEEDED
Status: DISCONTINUED | OUTPATIENT
Start: 2023-09-08 | End: 2023-09-08 | Stop reason: HOSPADM

## 2023-09-08 RX ORDER — FENTANYL CITRATE/PF 50 MCG/ML
25 SYRINGE (ML) INJECTION
Status: DISCONTINUED | OUTPATIENT
Start: 2023-09-08 | End: 2023-09-08 | Stop reason: HOSPADM

## 2023-09-08 RX ORDER — ACETAMINOPHEN 325 MG/1
975 TABLET ORAL EVERY 6 HOURS PRN
Status: DISCONTINUED | OUTPATIENT
Start: 2023-09-08 | End: 2023-09-08 | Stop reason: HOSPADM

## 2023-09-08 RX ORDER — MIDAZOLAM HYDROCHLORIDE 2 MG/2ML
INJECTION, SOLUTION INTRAMUSCULAR; INTRAVENOUS AS NEEDED
Status: DISCONTINUED | OUTPATIENT
Start: 2023-09-08 | End: 2023-09-08

## 2023-09-08 RX ORDER — ONDANSETRON 2 MG/ML
4 INJECTION INTRAMUSCULAR; INTRAVENOUS ONCE AS NEEDED
Status: DISCONTINUED | OUTPATIENT
Start: 2023-09-08 | End: 2023-09-08 | Stop reason: HOSPADM

## 2023-09-08 RX ORDER — SODIUM CHLORIDE, SODIUM LACTATE, POTASSIUM CHLORIDE, CALCIUM CHLORIDE 600; 310; 30; 20 MG/100ML; MG/100ML; MG/100ML; MG/100ML
125 INJECTION, SOLUTION INTRAVENOUS CONTINUOUS
Status: DISCONTINUED | OUTPATIENT
Start: 2023-09-08 | End: 2023-09-08 | Stop reason: HOSPADM

## 2023-09-08 RX ORDER — SODIUM CHLORIDE 9 MG/ML
125 INJECTION, SOLUTION INTRAVENOUS CONTINUOUS
Status: DISCONTINUED | OUTPATIENT
Start: 2023-09-08 | End: 2023-09-08 | Stop reason: HOSPADM

## 2023-09-08 RX ORDER — IBUPROFEN 600 MG/1
600 TABLET ORAL EVERY 6 HOURS PRN
Status: DISCONTINUED | OUTPATIENT
Start: 2023-09-08 | End: 2023-09-08 | Stop reason: HOSPADM

## 2023-09-08 RX ORDER — PROPOFOL 10 MG/ML
INJECTION, EMULSION INTRAVENOUS AS NEEDED
Status: DISCONTINUED | OUTPATIENT
Start: 2023-09-08 | End: 2023-09-08

## 2023-09-08 RX ORDER — LIDOCAINE HYDROCHLORIDE 20 MG/ML
INJECTION, SOLUTION EPIDURAL; INFILTRATION; INTRACAUDAL; PERINEURAL AS NEEDED
Status: DISCONTINUED | OUTPATIENT
Start: 2023-09-08 | End: 2023-09-08

## 2023-09-08 RX ORDER — DEXAMETHASONE SODIUM PHOSPHATE 10 MG/ML
INJECTION, SOLUTION INTRAMUSCULAR; INTRAVENOUS AS NEEDED
Status: DISCONTINUED | OUTPATIENT
Start: 2023-09-08 | End: 2023-09-08

## 2023-09-08 RX ORDER — FENTANYL CITRATE 50 UG/ML
INJECTION, SOLUTION INTRAMUSCULAR; INTRAVENOUS AS NEEDED
Status: DISCONTINUED | OUTPATIENT
Start: 2023-09-08 | End: 2023-09-08

## 2023-09-08 RX ADMIN — DEXAMETHASONE SODIUM PHOSPHATE 8 MG: 10 INJECTION INTRAMUSCULAR; INTRAVENOUS at 14:47

## 2023-09-08 RX ADMIN — ONDANSETRON 4 MG: 2 INJECTION INTRAMUSCULAR; INTRAVENOUS at 15:00

## 2023-09-08 RX ADMIN — EPHEDRINE SULFATE 5 MG: 50 INJECTION, SOLUTION INTRAVENOUS at 14:53

## 2023-09-08 RX ADMIN — SODIUM CHLORIDE, SODIUM LACTATE, POTASSIUM CHLORIDE, AND CALCIUM CHLORIDE: .6; .31; .03; .02 INJECTION, SOLUTION INTRAVENOUS at 15:01

## 2023-09-08 RX ADMIN — MIDAZOLAM 2 MG: 1 INJECTION INTRAMUSCULAR; INTRAVENOUS at 14:34

## 2023-09-08 RX ADMIN — LIDOCAINE HYDROCHLORIDE 100 MG: 20 INJECTION, SOLUTION EPIDURAL; INFILTRATION; INTRACAUDAL at 14:40

## 2023-09-08 RX ADMIN — SODIUM CHLORIDE 125 ML/HR: 0.9 INJECTION, SOLUTION INTRAVENOUS at 12:29

## 2023-09-08 RX ADMIN — PROPOFOL 200 MG: 10 INJECTION, EMULSION INTRAVENOUS at 14:40

## 2023-09-08 RX ADMIN — FENTANYL CITRATE 100 MCG: 50 INJECTION INTRAMUSCULAR; INTRAVENOUS at 14:40

## 2023-09-08 NOTE — OP NOTE
OPERATIVE REPORT  PATIENT NAME: Lamonte Wayne    :  1965  MRN: [id number]  Pt Location: [facility] OR ROOM 02    SURGERY DATE: 2023    Surgeon(s) and Role:     * Karyle Fort, MD - Primary     * Otto Aj MD - Assisting    Preop Diagnosis:  Postmenopausal bleeding [N95.0]  Thickened endometrium [R93.89]    Post-Op Diagnosis Codes:     * Postmenopausal bleeding [N95.0]     * Thickened endometrium [R93.89]    Procedure(s):  (D&C) W/ HYSTEROSCOPY    Specimen(s):  ID Type Source Tests Collected by Time Destination   1 : Endometrial Curettings Tissue Endometrium TISSUE EXAM Karyle Fort, MD 2023 [id number]        Estimated Blood Loss:   Minimal    Drains:  * No LDAs found *    Anesthesia Type:   General    Operative Indications:  Postmenopausal bleeding [N95.0]  Thickened endometrium [R93.89]    Operative Findings:  1. External genitalia grossly normal in appearance. No ulcerations, no lacerations, no lesions. Bimanual exam revealed anteverted uterus with normal contours and freely mobile. No adnexal masses palpated bilaterally. 2.  Vagina and cervix were without any lacerations or lesions. Stage 2 rectocele and uterine prolapse noted. 3. Uterus sounded to 10 cm. 4. Hysteroscopic examination revealed atrophic endometrial lining with minimal tissue. No polyps or fibroids noted. Bilateral ostia were visualized. Complications:   None    Procedure and Technique:  The patient was taken to the operating room where a time out was performed to confirm correct patient and correct procedure. General LMA anesthesia (LMA) was administered and the patient was positioned on the OR table in the dorsal lithotomy position. All pressure points were padded and a yasmine hugger was placed to maintain control of core body temperature. A bimanual exam was performed and the uterus was noted to be anteverted, normal in size and consistency with no palpable adnexal masses or fullness.  The patient was prepped and draped in the usual sterile fashion. A straight catheter was introduced into the bladder, which was drained of 200cc of clear yellow urine. A weighted speculum was inserted into the vagina and a Prieto retractor was used to visualize the anterior lip of the cervix, which was then grasped with a single toothed tenaculum. The uterus was sounded to 10cm. The cervix was serially dilated to 16F using Heron dilators for introduction of the hysteroscope. Hysteroscope was introduced under direct visualization using normal saline solution as the distention media. Hysteroscope was advanced to the uterine fundus and the entire uterine cavity was inspected in a systematic manner. There were no polyps or fibroids visualized and endometrial lining was noted to be atrophic. Hysteroscope was withdrawn and sharp curetting was performed, starting at the 12'oclock position and rotating a total of 360 degrees to cover all surfaces. Endometrial tissue was obtained and sent for pathology. The single toothed tenaculum was removed from the anterior lip of the cervix. Good hemostasis was confirmed at the tenaculum puncture sites. Weighted speculum was then removed from the vagina. At the conclusion of the procedure, all needle, sponge, and instrument counts were noted to be correct x2. Dr. Obdulia Narvaez was present and participated in all key portions of the case. I was present for the entire procedure.     Patient Disposition:  PACU       SIGNATURE: Otoniel Brewer MD  DATE: September 8, 2023  TIME: 3:14 PM

## 2023-09-08 NOTE — ANESTHESIA POSTPROCEDURE EVALUATION
Post-Op Assessment Note    CV Status:  Stable    Pain management: adequate     Mental Status:  Awake   Hydration Status:  Stable   PONV Controlled:  Controlled   Airway Patency:  Patent      Post Op Vitals Reviewed: Yes      Staff: Anesthesiologist         No notable events documented.     BP      Temp     Pulse     Resp      SpO2      /87   Pulse 61   Temp (!) 97.3 °F (36.3 °C) (Temporal)   Resp 18   Ht 5' 6" (1.676 m)   Wt 112 kg (247 lb 2.2 oz)   SpO2 97%   BMI 39.89 kg/m²

## 2023-09-08 NOTE — ANESTHESIA PREPROCEDURE EVALUATION
Procedure:  (D&C) W/ HYSTEROSCOPY (Uterus)    Relevant Problems   CARDIO   (+) Atrial fibrillation (HCC)   (+) Mixed hyperlipidemia   (+) Primary hypertension      ENDO   (+) Acquired hypothyroidism      NEURO/PSYCH   (+) Anxiety   (+) Dysthymic disorder   (+) Mild episode of recurrent major depressive disorder (HCC)      PULMONARY   (+) Obstructive sleep apnea syndrome      Other   (+) CPAP (continuous positive airway pressure) dependence   (+) Class 2 obesity without serious comorbidity in adult        Physical Exam    Airway    Mallampati score: II  TM Distance: >3 FB  Neck ROM: full     Dental   No notable dental hx     Cardiovascular  Rhythm: regular, Rate: normal, Cardiovascular exam normal    Pulmonary  Pulmonary exam normal Breath sounds clear to auscultation,     Other Findings        Anesthesia Plan  ASA Score- 2     Anesthesia Type- general with ASA Monitors. Additional Monitors:   Airway Plan:           Plan Factors-    Chart reviewed. Existing labs reviewed. Patient summary reviewed. Patient is not a current smoker. Patient not instructed to abstain from smoking on day of procedure. Patient did not smoke on day of surgery. There is medical exclusion for perioperative obstructive sleep apnea risk education. Induction- intravenous. Postoperative Plan-     Informed Consent- Anesthetic plan and risks discussed with patient.

## 2023-09-08 NOTE — INTERVAL H&P NOTE
H&P reviewed. After examining the patient I find no changes in the patients condition since the H&P had been written.     Vitals:    09/08/23 1221   BP: 122/87   Pulse: 58   Resp: 16   Temp: 98.3 °F (36.8 °C)   SpO2: 96%

## 2023-09-18 PROCEDURE — 88305 TISSUE EXAM BY PATHOLOGIST: CPT | Performed by: PATHOLOGY

## 2023-09-18 NOTE — PROGRESS NOTES
Assessment      Doing well postoperatively. Operative findings again reviewed. Pathology report discussed. Plan     1. Continue any current medications. 2. Discussed judgment of not atypical endometrial hyperplasia. Offered hysterectomy for definitive treatment but declines. Advised progesterone therapy with Aygestin 10 mg daily and repeat endometrial biopsy in 3 months. 3. Activity restrictions: none  4. Anticipated return to work: not applicable. 5. Follow up: 3 months for EMB      Problem List Items Addressed This Visit    None        Subjective     Trenda Nyhan Alzheimer is a 60 y.o. female who presents to the office 2 weeks status post diagnostic hysteroscopy for abnormal uterine bleeding. Eating a regular diet without difficulty. Bowel movements are normal. The patient is not having any pain. The following portions of the patient's history were reviewed and updated as appropriate: allergies, current medications, past family history, past medical history, past social history, past surgical history and problem list.    Review of Systems   Constitutional: Negative. HENT: Negative. Eyes: Negative. Respiratory: Negative. Cardiovascular: Negative. Gastrointestinal: Negative. Endocrine: Negative. Genitourinary:        As noted in HPI   Musculoskeletal: Negative. Skin: Negative. Allergic/Immunologic: Negative. Neurological: Negative. Hematological: Negative. Psychiatric/Behavioral: Negative. Objective     There were no vitals taken for this visit. General:  alert and oriented, in no acute distress                 Tissue Exam: [id number]  Order: [id number]   Collected 9/8/2023  2:57 PM      Status: Final result      Visible to patient: Yes (seen)      Dx: Postmenopausal bleeding; Thickened en. ..      0 Result Notes      Component    Case Report   Surgical Pathology Report                         Case: [id number]                                    Authorizing Provider: Koki Ramirez MD           Collected:           09/08/2023 1457               Ordering Location:     [address] [place] [address]        Received:            09/08/2023 7112                                      [facility]                                                      Pathologist:           Blanca Ferris MD                                                                 Specimen:    Endometrium, Endometrial Curettings                                                        Final Diagnosis   A. Endometrium (curetting):     - Non-atypical endometrial hyperplasia involving polyp.     - Negative for atypia and malignancy. Electronically signed by Blanca Ferris MD on 9/18/2023 at 12:29 PM   Note    Intradepartmental consultation concurs with the diagnosis. Additional Information    All reported additional testing was performed with appropriately reactive controls.  These tests were developed and their performance characteristics determined by [facility] or appropriate performing facility, though some tests may be performed on tissues which have not been validated for performance characteristics (such as staining performed on alcohol exposed cell blocks and decalcified tissues).  Results should be interpreted with caution and in the context of the patients’ clinical condition. These tests may not be cleared or approved by the U.S. Food and Drug Administration, though the FDA has determined that such clearance or approval is not necessary. These tests are used for clinical purposes and they should not be regarded as investigational or for research. This laboratory has been approved by CLIA 88, designated as a high-complexity laboratory and is qualified to perform these tests. Interpretation performed at [address] Description    A.  The specimen is received in formalin, labeled with the patient's name and hospital number, and is designated " endometrial curettings". The specimen consists of multiple tan to brown to colorless mucinous, hemorrhagic and possible soft tissue fragments measuring in aggregate 1 x 1 x 0.1 cm. Entirely submitted. One cassette.   In an embedding bag.     Note: The estimated total formalin fixation time based upon information provided by the submitting clinician and the standard processing schedule is under 72 hours.     [facility]   Resulting Agency [facility]              Specimen Collected: 09/08/23  2:57 PM Last Resulted: 09/18/23 12:29 PM        Order Details      View Encounter      Lab and Collection Details      Routing      Result History     View All Conversations on this Encounter           Scans on Order [id number]    Lab Result Document - Document on 9/18/2023 12:29 PM

## 2023-09-20 ENCOUNTER — OFFICE VISIT (OUTPATIENT)
Dept: FAMILY MEDICINE CLINIC | Facility: CLINIC | Age: 58
End: 2023-09-20

## 2023-09-20 VITALS
SYSTOLIC BLOOD PRESSURE: 125 MMHG | DIASTOLIC BLOOD PRESSURE: 64 MMHG | OXYGEN SATURATION: 96 % | WEIGHT: 248.6 LBS | HEIGHT: 66 IN | TEMPERATURE: 97.6 F | HEART RATE: 64 BPM | BODY MASS INDEX: 39.95 KG/M2

## 2023-09-20 DIAGNOSIS — N95.0 POSTMENOPAUSAL BLEEDING: ICD-10-CM

## 2023-09-20 DIAGNOSIS — E03.9 ACQUIRED HYPOTHYROIDISM: ICD-10-CM

## 2023-09-20 DIAGNOSIS — Z99.89 CPAP (CONTINUOUS POSITIVE AIRWAY PRESSURE) DEPENDENCE: ICD-10-CM

## 2023-09-20 DIAGNOSIS — F41.9 ANXIETY: ICD-10-CM

## 2023-09-20 DIAGNOSIS — F33.0 MILD EPISODE OF RECURRENT MAJOR DEPRESSIVE DISORDER (HCC): Primary | ICD-10-CM

## 2023-09-20 DIAGNOSIS — E78.2 MIXED HYPERLIPIDEMIA: ICD-10-CM

## 2023-09-20 DIAGNOSIS — I48.91 ATRIAL FIBRILLATION, UNSPECIFIED TYPE (HCC): ICD-10-CM

## 2023-09-20 DIAGNOSIS — I10 PRIMARY HYPERTENSION: ICD-10-CM

## 2023-09-20 DIAGNOSIS — Z12.31 ENCOUNTER FOR SCREENING MAMMOGRAM FOR BREAST CANCER: ICD-10-CM

## 2023-09-20 DIAGNOSIS — Z23 ENCOUNTER FOR IMMUNIZATION: ICD-10-CM

## 2023-09-20 DIAGNOSIS — G47.33 OBSTRUCTIVE SLEEP APNEA SYNDROME: ICD-10-CM

## 2023-09-20 PROCEDURE — 99213 OFFICE O/P EST LOW 20 MIN: CPT | Performed by: NURSE PRACTITIONER

## 2023-09-20 NOTE — PROGRESS NOTES
Name: Pennie Thompson      : 1965      MRN: [id number]  Encounter Provider: PIERRE Grace  Encounter Date: 2023   Encounter department: [address] [place] [address]     1. Mild episode of recurrent major depressive disorder (HCC)  Assessment & Plan:  Controlled with citalopram.      2. Mixed hyperlipidemia  Assessment & Plan:  Continues with statin. 3. Primary hypertension  Assessment & Plan:  BP in acceptable range today. 4. Atrial fibrillation, unspecified type [facility])  Assessment & Plan:  Stable at this time. Continues on Cardizem PO      5. Acquired hypothyroidism  Assessment & Plan:  TSH ordered. Controlled with medication. Orders:  -     TSH, 3rd generation; Future    6. Obstructive sleep apnea syndrome  Assessment & Plan:  Continues with CPAP nightly. 7. CPAP (continuous positive airway pressure) dependence    8. Anxiety    9. Encounter for immunization    10. Encounter for screening mammogram for breast cancer    11. Postmenopausal bleeding  Assessment & Plan:  Recent D & C by GYN - healing well. Subjective      Here today for a follow-up. Recent Gyn procedure - doing well. Continues to do well with her left knee post surgery. Recently got a full time job - enjoying it and trying to find the balance. Review of Systems   Constitutional: Negative. HENT: Negative. Respiratory: Negative. Cardiovascular: Negative. Gastrointestinal: Negative. Neurological: Negative. All other systems reviewed and are negative.       Current Outpatient Medications on File Prior to Visit   Medication Sig   • atorvastatin (LIPITOR) 10 mg tablet Take 1 tablet (10 mg total) by mouth in the morning   • B Complex Vitamins (Vitamin B Complex) TABS Take 2 tablets by mouth   • Barberry-Oreg Grape-Goldenseal 200-200-50 MG CAPS Take 500 mg by mouth   • cholecalciferol (VITAMIN D3) 1,000 units tablet Take 10,000 Units by mouth daily   • citalopram (CeleXA) 20 mg tablet Take 0.5 tablets (10 mg total) by mouth in the morning   • co-enzyme Q-10 30 MG capsule Take 100 mg by mouth 2 (two) times a day   • Digestive Enzymes (SIMILASE PO) Take 1 capsule by mouth 2 (two) times a day   • diltiazem (CARDIZEM CD) 180 mg 24 hr capsule Take 2 capsules (360 mg total) by mouth daily   • Lactobacillus TABS Take 1 tablet by mouth   • Magnesium 400 MG CAPS Take by mouth   • Milk Thistle 500 MG CAPS Take by mouth   • NP Thyroid 90 MG tablet TAKE ONE TABLET BY MOUTH EVERY MORNING (Patient taking differently: Take 45 mg by mouth daily)   • Omega-3 Fatty Acids (fish oil) 1,000 mg Take 1,000 mg by mouth daily       Objective     /64 (BP Location: Left arm, Patient Position: Sitting, Cuff Size: Standard)   Pulse 64   Temp 97.6 °F (36.4 °C)   Ht 5' 6" (1.676 m)   Wt 113 kg (248 lb 9.6 oz)   SpO2 96%   BMI 40.13 kg/m²     Physical Exam  Constitutional:       Appearance: Normal appearance. Cardiovascular:      Rate and Rhythm: Normal rate and regular rhythm. Pulmonary:      Effort: Pulmonary effort is normal.      Breath sounds: Normal breath sounds. Neurological:      Mental Status: She is alert. Psychiatric:         Mood and Affect: Mood normal.         Behavior: Behavior normal.         Thought Content:  Thought content normal.         Judgment: Judgment normal.       PIERRE Pugh

## 2023-09-21 ENCOUNTER — OFFICE VISIT (OUTPATIENT)
Dept: OBGYN CLINIC | Facility: CLINIC | Age: 58
End: 2023-09-21

## 2023-09-21 VITALS
HEART RATE: 62 BPM | SYSTOLIC BLOOD PRESSURE: 120 MMHG | HEIGHT: 66 IN | BODY MASS INDEX: 40.5 KG/M2 | DIASTOLIC BLOOD PRESSURE: 80 MMHG | TEMPERATURE: 96.2 F | WEIGHT: 252 LBS

## 2023-09-21 DIAGNOSIS — N85.00 ENDOMETRIAL HYPERPLASIA WITHOUT ATYPIA: Primary | ICD-10-CM

## 2023-09-21 PROCEDURE — 99024 POSTOP FOLLOW-UP VISIT: CPT | Performed by: OBSTETRICS & GYNECOLOGY

## 2023-09-21 NOTE — PATIENT INSTRUCTIONS
Endometrial Biopsy   WHAT YOU NEED TO KNOW:   Endometrial biopsy is a procedure to remove a tissue sample from the lining of your uterus. This procedure is done through your vagina. DISCHARGE INSTRUCTIONS:   Call your doctor or surgeon if:   You have severe pain that does not go away after you take pain medicine. You have a fever. You have pain or cramping that lasts longer than a few days. You have white or yellow vaginal discharge. You have more vaginal bleeding than you were told to expect. You have questions or concerns about your condition or care. Medicines:   NSAIDs , such as ibuprofen, help decrease swelling, pain, and fever. NSAIDs can cause stomach bleeding or kidney problems in certain people. If you take blood thinner medicine, always ask your healthcare provider if NSAIDs are safe for you. Always read the medicine label and follow directions. Take your medicine as directed. Contact your healthcare provider if you think your medicine is not helping or if you have side effects. Tell your provider if you are allergic to any medicine. Keep a list of the medicines, vitamins, and herbs you take. Include the amounts, and when and why you take them. Bring the list or the pill bottles to follow-up visits. Carry your medicine list with you in case of an emergency. Do not have sex, douche, or use a tampon  for at least 10 to 14 days. Follow up with your doctor or surgeon as directed:  Write down your questions so you remember to ask them during your visits. © Copyright Yoan Abts 2023 Information is for End User's use only and may not be sold, redistributed or otherwise used for commercial purposes. The above information is an  only. It is not intended as medical advice for individual conditions or treatments. Talk to your doctor, nurse or pharmacist before following any medical regimen to see if it is safe and effective for you.

## 2023-10-12 ENCOUNTER — TELEPHONE (OUTPATIENT)
Dept: OBGYN CLINIC | Facility: CLINIC | Age: 58
End: 2023-10-12

## 2023-10-12 NOTE — TELEPHONE ENCOUNTER
Pt called stating that she has been on the progesterone for 1 month now, and on Tuesday, accidentally missed a dose, and states that she started bleeding again. She resumed taking the pills again on Wednesday, but is still bleeding, although it has decreased. She is asking if this is ok, will it stop on its own? Please advise. Thank you.

## 2023-10-13 DIAGNOSIS — F41.9 ANXIETY: ICD-10-CM

## 2023-10-13 DIAGNOSIS — N85.00 ENDOMETRIAL HYPERPLASIA WITHOUT ATYPIA: ICD-10-CM

## 2023-10-13 RX ORDER — CITALOPRAM 20 MG/1
10 TABLET ORAL DAILY
Qty: 45 TABLET | Refills: 0 | Status: SHIPPED | OUTPATIENT
Start: 2023-10-13

## 2023-10-19 DIAGNOSIS — I10 PRIMARY HYPERTENSION: ICD-10-CM

## 2023-10-19 RX ORDER — DILTIAZEM HYDROCHLORIDE 180 MG/1
360 CAPSULE, COATED, EXTENDED RELEASE ORAL DAILY
Qty: 60 CAPSULE | Refills: 0 | Status: SHIPPED | OUTPATIENT
Start: 2023-10-19

## 2023-10-22 DIAGNOSIS — F41.9 ANXIETY: ICD-10-CM

## 2023-10-23 RX ORDER — CITALOPRAM 20 MG/1
10 TABLET ORAL DAILY
Qty: 45 TABLET | Refills: 0 | Status: SHIPPED | OUTPATIENT
Start: 2023-10-23

## 2023-10-25 ENCOUNTER — TELEPHONE (OUTPATIENT)
Dept: FAMILY MEDICINE CLINIC | Facility: CLINIC | Age: 58
End: 2023-10-25

## 2023-10-25 NOTE — TELEPHONE ENCOUNTER
Telephone call from patient that her medications were mixed up. She is supposed to one whole tablet of citalopram not 0.5 and her thyroid medication is supposed to be half not a whole. Please advise.

## 2023-11-05 DIAGNOSIS — I10 PRIMARY HYPERTENSION: ICD-10-CM

## 2023-11-05 DIAGNOSIS — E78.2 MIXED HYPERLIPIDEMIA: ICD-10-CM

## 2023-11-05 DIAGNOSIS — N85.00 ENDOMETRIAL HYPERPLASIA WITHOUT ATYPIA: ICD-10-CM

## 2023-11-06 RX ORDER — DILTIAZEM HYDROCHLORIDE 180 MG/1
360 CAPSULE, COATED, EXTENDED RELEASE ORAL DAILY
Qty: 60 CAPSULE | Refills: 2 | Status: SHIPPED | OUTPATIENT
Start: 2023-11-06

## 2023-11-06 RX ORDER — ATORVASTATIN CALCIUM 10 MG/1
10 TABLET, FILM COATED ORAL DAILY
Qty: 90 TABLET | Refills: 1 | Status: SHIPPED | OUTPATIENT
Start: 2023-11-06

## 2023-11-08 ENCOUNTER — HOSPITAL ENCOUNTER (OUTPATIENT)
Dept: RADIOLOGY | Facility: CLINIC | Age: 58
Discharge: HOME | End: 2023-11-08
Payer: COMMERCIAL

## 2023-11-08 VITALS — HEIGHT: 67 IN | BODY MASS INDEX: 39.24 KG/M2 | WEIGHT: 250 LBS

## 2023-11-08 DIAGNOSIS — Z12.31 ENCOUNTER FOR SCREENING MAMMOGRAM FOR BREAST CANCER: ICD-10-CM

## 2023-11-08 PROCEDURE — 77067 SCR MAMMO BI INCL CAD: CPT

## 2023-11-08 PROCEDURE — 77063 BREAST TOMOSYNTHESIS BI: CPT

## 2023-12-04 DIAGNOSIS — N85.00 ENDOMETRIAL HYPERPLASIA WITHOUT ATYPIA: ICD-10-CM

## 2023-12-10 ENCOUNTER — AMB VIDEO VISIT (OUTPATIENT)
Dept: OTHER | Facility: HOSPITAL | Age: 58
End: 2023-12-10

## 2023-12-10 DIAGNOSIS — H10.31 ACUTE BACTERIAL CONJUNCTIVITIS OF RIGHT EYE: Primary | ICD-10-CM

## 2023-12-10 PROCEDURE — ECARE PR SL URGENT CARE VIRTUAL VISIT: Performed by: PHYSICIAN ASSISTANT

## 2023-12-10 RX ORDER — CIPROFLOXACIN HYDROCHLORIDE 3.5 MG/ML
1 SOLUTION/ DROPS TOPICAL
Qty: 5 ML | Refills: 0 | Status: SHIPPED | OUTPATIENT
Start: 2023-12-10 | End: 2023-12-17

## 2023-12-10 NOTE — PATIENT INSTRUCTIONS
Conjunctivitis   WHAT YOU NEED TO KNOW:   Conjunctivitis, or pink eye, is inflammation of your conjunctiva. The conjunctiva is a thin tissue that covers the front of your eye and the back of your eyelid. The conjunctiva helps protect your eye and keep it moist. The most common cause of conjunctivitis is infection with bacteria or a virus. Allergies or exposure to a chemical may also cause conjunctivitis. Conjunctivitis is easily spread from person to person. DISCHARGE INSTRUCTIONS:   Return to the emergency department if:   You have worsening eye pain. The swelling in your eye gets worse, even after treatment. Your vision suddenly becomes worse, or you cannot see at all. Call your doctor if:   Your start to notice changes in your vision. You develop a fever and ear pain. You have tiny bumps or spots of blood on your eye. You have questions or concerns about your condition or care. Medicines: You may need any of the following: Allergy medicine  helps decrease itchy, red, swollen eyes caused by allergies. It may be given as a pill, eye drops, or nasal spray. Antibiotics  may be needed if your conjunctivitis is caused by bacteria. This medicine may be given as a pill, eye drops, or eye ointment. Take your medicine as directed. Contact your healthcare provider if you think your medicine is not helping or if you have side effects. Tell your provider if you are allergic to any medicine. Keep a list of the medicines, vitamins, and herbs you take. Include the amounts, and when and why you take them. Bring the list or the pill bottles to follow-up visits. Carry your medicine list with you in case of an emergency. Manage your symptoms:   Apply a cool compress. Wet a washcloth with cold water and place it on your eye. This will help decrease itching and irritation. Use artificial tears. This will help lessen your symptoms, including itching or irritation.     Do not wear contact lenses  until treatment is complete and your symptoms are gone. Flush your eye. You may need to flush your eye with saline to help decrease your symptoms. Ask for more information on how to flush your eye. Prevent the spread of conjunctivitis:   Wash your hands with soap and water often. Wash your hands before and after you touch your eyes. Wash your hands after you use the bathroom, change a child's diaper, or sneeze. Wash your hands before you prepare or eat food. Avoid contact with others. Do not share towels or washcloths. Try to stay away from others as much as possible. Ask when you can return to work or school. Avoid allergens and irritants. Try to avoid the things that cause your allergies, such as pets, dust, or grass. Stay away from smoke filled areas. Shield your eyes from wind and sun. Throw away eye makeup. Bacteria can stay in eye makeup. Throw away your current mascara and other eye makeup. Never share mascara or other eye makeup with anyone. Follow up with your doctor as directed: You may be referred to an ophthalmologist for treatment. Write down your questions so you remember to ask them during your visits. © Copyright Elisha Goltz 2023 Information is for End User's use only and may not be sold, redistributed or otherwise used for commercial purposes. The above information is an  only. It is not intended as medical advice for individual conditions or treatments. Talk to your doctor, nurse or pharmacist before following any medical regimen to see if it is safe and effective for you.

## 2023-12-10 NOTE — PROGRESS NOTES
Required Documentation:  Encounter provider Chani Benoit PA-C    Provider located at [facility] [id number]    Identify all parties in room with patient during virtual visit:  No one else    The patient was identified by name and date of birth. Lenka Wayne was informed that this is a telemedicine visit and that the visit is being conducted through the [address] [place] [address] Dr platform. She agrees to proceed. .  My office door was closed. No one else was in the room. She acknowledged consent and understanding of privacy and security of the video platform. The patient has agreed to participate and understands they can discontinue the visit at any time. Verification of patient location:    Patient is located at home in the following state in which I hold an active license [place]    Patient is aware this is a billable service. Reason for visit is No chief complaint on file. Subjective  HPI   Patient states that she took a nap and had yellow discharge, red eye of her right eye. It is also painful. She denies any other symptoms. She does wear contacts. She has tried some eye drops. She did fall asleep with her contacts. Past Medical History:   Diagnosis Date    Atrial fibrillation ([address]) 10/2020    CPAP (continuous positive airway pressure) dependence     Disease of thyroid gland     Hypertension     Sleep apnea        Past Surgical History:   Procedure Laterality Date    BREAST BIOPSY Left 2012    stereotactic bx - benign    KNEE SURGERY Left 02/17/2023    AK HYSTEROSCOPY BX ENDOMETRIUM&/POLYPC W/WO D&C N/A 9/8/2023    Procedure: (D&C) W/ HYSTEROSCOPY;  Surgeon: Rennis Mortimer, MD;  Location: [facility];  Service: Gynecology        Allergies   Allergen Reactions    Food Other (See Comments)     Wheat GI upset; joint inflammation    Other Other (See Comments)     ENVIRONMENTAL ALLERGENS.  Pollen, dust, mold : sneezing  PLANTS. Sneezing  PET DANDER. Sneezing      Penicillins Other (See Comments)     Family hx of allergy to penicillin    Wheat Bran - Food Allergy Abdominal Pain    Horse-Derived Products Rash     Horse serum       Review of Systems   Constitutional: Negative. HENT: Negative. Eyes:  Positive for pain, discharge, redness and itching. Respiratory: Negative. Cardiovascular: Negative. Gastrointestinal: Negative. Musculoskeletal: Negative. Neurological: Negative. Psychiatric/Behavioral: Negative. Video Exam    There were no vitals filed for this visit. Physical Exam  Constitutional:       General: She is not in acute distress. Appearance: Normal appearance. She is not ill-appearing, toxic-appearing or diaphoretic. HENT:      Head: Normocephalic and atraumatic. Eyes:      General:         Right eye: Discharge present. Extraocular Movements: Extraocular movements intact. Comments: Right conjunctiavea with erythema   Pulmonary:      Effort: Pulmonary effort is normal.   Neurological:      General: No focal deficit present. Mental Status: She is alert and oriented to person, place, and time. Psychiatric:         Mood and Affect: Mood normal.         Behavior: Behavior normal.         Visit Time  Total Visit Duration: 5 minutes    Assessment/Plan:    Diagnoses and all orders for this visit:    Acute bacterial conjunctivitis of right eye  -     ciprofloxacin (CILOXAN) 0.3 % ophthalmic solution; Administer 1 drop to the right eye every 4 (four) hours while awake for 7 days        Patient Instructions   Conjunctivitis   WHAT YOU NEED TO KNOW:   Conjunctivitis, or pink eye, is inflammation of your conjunctiva. The conjunctiva is a thin tissue that covers the front of your eye and the back of your eyelid. The conjunctiva helps protect your eye and keep it moist. The most common cause of conjunctivitis is infection with bacteria or a virus.  Allergies or exposure to a chemical may also cause conjunctivitis. Conjunctivitis is easily spread from person to person. DISCHARGE INSTRUCTIONS:   Return to the emergency department if:   You have worsening eye pain. The swelling in your eye gets worse, even after treatment. Your vision suddenly becomes worse, or you cannot see at all. Call your doctor if:   Your start to notice changes in your vision. You develop a fever and ear pain. You have tiny bumps or spots of blood on your eye. You have questions or concerns about your condition or care. Medicines: You may need any of the following: Allergy medicine  helps decrease itchy, red, swollen eyes caused by allergies. It may be given as a pill, eye drops, or nasal spray. Antibiotics  may be needed if your conjunctivitis is caused by bacteria. This medicine may be given as a pill, eye drops, or eye ointment. Take your medicine as directed. Contact your healthcare provider if you think your medicine is not helping or if you have side effects. Tell your provider if you are allergic to any medicine. Keep a list of the medicines, vitamins, and herbs you take. Include the amounts, and when and why you take them. Bring the list or the pill bottles to follow-up visits. Carry your medicine list with you in case of an emergency. Manage your symptoms:   Apply a cool compress. Wet a washcloth with cold water and place it on your eye. This will help decrease itching and irritation. Use artificial tears. This will help lessen your symptoms, including itching or irritation. Do not wear contact lenses  until treatment is complete and your symptoms are gone. Flush your eye. You may need to flush your eye with saline to help decrease your symptoms. Ask for more information on how to flush your eye. Prevent the spread of conjunctivitis:   Wash your hands with soap and water often. Wash your hands before and after you touch your eyes.  Wash your hands after you use the bathroom, change a child's diaper, or sneeze. Wash your hands before you prepare or eat food. Avoid contact with others. Do not share towels or washcloths. Try to stay away from others as much as possible. Ask when you can return to work or school. Avoid allergens and irritants. Try to avoid the things that cause your allergies, such as pets, dust, or grass. Stay away from smoke filled areas. Shield your eyes from wind and sun. Throw away eye makeup. Bacteria can stay in eye makeup. Throw away your current mascara and other eye makeup. Never share mascara or other eye makeup with anyone. Follow up with your doctor as directed: You may be referred to an ophthalmologist for treatment. Write down your questions so you remember to ask them during your visits. © Copyright [facility] 2023 Information is for End User's use only and may not be sold, redistributed or otherwise used for commercial purposes. The above information is an  only. It is not intended as medical advice for individual conditions or treatments. Talk to your doctor, nurse or pharmacist before following any medical regimen to see if it is safe and effective for you.

## 2023-12-10 NOTE — CARE ANYWHERE EVISITS
Visit Summary for EVA TELLO - Gender: Female - Date of Birth: [id number]  Date: [id number] - Duration: 3 minutes  Patient: [place] Triana Arsenio ALZHEIMER  Provider: Alfonse Galeazzi PA-C    Patient Contact Information  Address  [address] [place] [address]; [address] [place] [address] Road  [phone]    Visit Topics    Triage Questions   What is your current physical address in the event of a medical emergency? Answer []  Are you allergic to any medications? Answer []  Are you now or could you be pregnant? Answer []  Do you have any immune system compromise or chronic lung   disease? Answer []  Do you have any vulnerable family members in the home (infant, pregnant, cancer, elderly)? Answer []     Conversation Transcripts  [0A][0A] [Notification] You are connected with Alfonse Galeazzi PA-C, Urgent Care Specialist.[0A][Notification] [facility] is located in [address] [place] [address],[address]. [0A][Notification] Newton Grass has shared health history. Larry Jackson .[0A]    Diagnosis  Unspecified acute conjunctivitis, right eye    Procedures  Value: [id number] Code: CPT-4 UNLISTED E&M SERVICE    Medications Prescribed    No prescriptions ordered    Electronically signed by: Ema Berkowitz(NPI [id number])

## 2023-12-11 NOTE — PROGRESS NOTES
Assessment/Plan:    Problem List Items Addressed This Visit    None  Visit Diagnoses       Endometrial hyperplasia without atypia    -  Primary    Relevant Orders    Tissue Exam          Counseled on need for EMB today. Endometrial biopsy for surveillance of atypical endometrial hyperplasia. Patient reports no bleeding on Aygestin 10 mg. Patient states she is not emotionally ready for hysterectomy. Discussed with patient again that the preferred treatment is a hysterectomy in a postmenopausal patient but progestin therapy is an alternative if hysterectomy is not an option at this time. We reviewed the plan for endometrial surveillance for up to 2 years. Continue Aygestin 10 mg. Repeat EMB every 3-6 months for 2 years - once normal endometrium, annual surveillance with TVUS. Continue progesterone therapy if with risk factors or persistent bleeding    Subjective   Patient ID: Martha Henriquez is a 60 y.o. female. Patient is here for a follow-up. Chief Complaint   Patient presents with    Follow-up     Leonel Khan she is not getting the EMB done today        Patient with atypical endometrial hyperplasia involving polyp, currently on AYGESTIN 10 mg daily. Declined definitive surgeyr with hysterectomy, presenting for endometrial biopsy      Menstrual History:  OB History          0    Para   0    Term   0       0    AB   0    Living   0         SAB   0    IAB   0    Ectopic   0    Multiple   0    Live Births   0                Menarche age: 14  No LMP recorded.  Patient is postmenopausal.         Past Medical History:   Diagnosis Date    Atrial fibrillation ([address]) 10/2020    CPAP (continuous positive airway pressure) dependence     Disease of thyroid gland     Hypertension     Sleep apnea        Past Surgical History:   Procedure Laterality Date    BREAST BIOPSY Left     stereotactic bx - benign    KNEE SURGERY Left 2023    ND HYSTEROSCOPY BX ENDOMETRIUM&/POLYPC W/WO D&C N/A 9/8/2023    Procedure: (D&C) W/ HYSTEROSCOPY;  Surgeon: Abhishek Teixeira MD;  Location: [facility];  Service: Gynecology       Social History     Tobacco Use    Smoking status: Never    Smokeless tobacco: Never   Vaping Use    Vaping status: Never Used   Substance Use Topics    Alcohol use: Not Currently    Drug use: Never       Allergies   Allergen Reactions    Food Other (See Comments)     Wheat GI upset; joint inflammation    Other Other (See Comments)     ENVIRONMENTAL ALLERGENS. Pollen, dust, mold : sneezing  PLANTS. Sneezing  PET DANDER.  Sneezing      Penicillins Other (See Comments)     Family hx of allergy to penicillin    Wheat Bran - Food Allergy Abdominal Pain    Horse-Derived Products Rash     Horse serum         Current Outpatient Medications:     atorvastatin (LIPITOR) 10 mg tablet, Take 1 tablet (10 mg total) by mouth in the morning, Disp: 90 tablet, Rfl: 1    B Complex Vitamins (Vitamin B Complex) TABS, Take 2 tablets by mouth, Disp: , Rfl:     Barberry-Oreg Grape-Goldenseal 200-200-50 MG CAPS, Take 500 mg by mouth, Disp: , Rfl:     cholecalciferol (VITAMIN D3) 1,000 units tablet, Take 10,000 Units by mouth daily, Disp: , Rfl:     ciprofloxacin (CILOXAN) 0.3 % ophthalmic solution, Administer 1 drop to the right eye every 4 (four) hours while awake for 7 days, Disp: 5 mL, Rfl: 0    citalopram (CeleXA) 20 mg tablet, Take 1 tablet (20 mg total) by mouth in the morning, Disp: 90 tablet, Rfl: 0    co-enzyme Q-10 30 MG capsule, Take 100 mg by mouth 2 (two) times a day, Disp: , Rfl:     Digestive Enzymes (SIMILASE PO), Take 1 capsule by mouth 2 (two) times a day, Disp: , Rfl:     diltiazem (CARDIZEM CD) 180 mg 24 hr capsule, Take 2 capsules (360 mg total) by mouth daily, Disp: 60 capsule, Rfl: 2    Lactobacillus TABS, Take 1 tablet by mouth, Disp: , Rfl:     Magnesium 400 MG CAPS, Take by mouth, Disp: , Rfl:     Milk Thistle 500 MG CAPS, Take by mouth, Disp: , Rfl:     norethindrone (AYGESTIN) 5 mg tablet, TAKE TWO TABLETS BY MOUTH DAILY, Disp: 60 tablet, Rfl: 3    NP Thyroid 90 MG tablet, TAKE ONE TABLET BY MOUTH EVERY MORNING (Patient taking differently: Take 45 mg by mouth daily), Disp: 30 tablet, Rfl: 5    Omega-3 Fatty Acids (fish oil) 1,000 mg, Take 1,000 mg by mouth daily, Disp: , Rfl:       Review of Systems   Constitutional: Negative. HENT: Negative. Eyes: Negative. Respiratory: Negative. Cardiovascular: Negative. Gastrointestinal: Negative. Endocrine: Negative. Genitourinary:         As noted in HPI   Musculoskeletal: Negative. Skin: Negative. Allergic/Immunologic: Negative. Neurological: Negative. Hematological: Negative. Psychiatric/Behavioral: Negative. /80   Pulse 66   Ht 5' 6.5" (1.689 m)   Wt 112 kg (246 lb)   SpO2 99%   BMI 39.11 kg/m²       Physical Exam  Constitutional:       General: She is not in acute distress. Appearance: She is well-developed. Genitourinary:      Bladder and urethral meatus normal.      No lesions in the vagina. Right Labia: No rash, tenderness, lesions, skin changes or Bartholin's cyst.     Left Labia: No tenderness, lesions, skin changes, Bartholin's cyst or rash. No vaginal discharge, erythema, tenderness or bleeding. No vaginal prolapse present. No vaginal atrophy present. Right Adnexa: not tender, not full and no mass present. Left Adnexa: not tender, not full and no mass present. No cervical polyp. Uterus is not enlarged or tender. No uterine mass detected. Pelvic exam was performed with patient in the lithotomy position. Rectum:      No tenderness. Cardiovascular:      Rate and Rhythm: Normal rate. Pulmonary:      Effort: Pulmonary effort is normal.   Abdominal:      General: There is no distension. Palpations: Abdomen is soft. Tenderness: There is no abdominal tenderness.    Neurological:      Mental Status: She is alert and oriented to person, place, and time. Skin:     General: Skin is warm and dry.    Psychiatric:         Behavior: Behavior normal.       Future Appointments   Date Time Provider [address]   3/20/2024 10:00 AM PIERRE Jasmine PCP [place]   11/11/2024  4:00 PM [facility]

## 2023-12-13 ENCOUNTER — PROCEDURE VISIT (OUTPATIENT)
Dept: OBGYN CLINIC | Facility: CLINIC | Age: 58
End: 2023-12-13

## 2023-12-13 VITALS
HEIGHT: 67 IN | HEART RATE: 66 BPM | SYSTOLIC BLOOD PRESSURE: 124 MMHG | WEIGHT: 246 LBS | BODY MASS INDEX: 38.61 KG/M2 | DIASTOLIC BLOOD PRESSURE: 80 MMHG | OXYGEN SATURATION: 99 %

## 2023-12-13 DIAGNOSIS — N85.00 ENDOMETRIAL HYPERPLASIA WITHOUT ATYPIA: Primary | ICD-10-CM

## 2023-12-13 PROCEDURE — 58100 BIOPSY OF UTERUS LINING: CPT | Performed by: OBSTETRICS & GYNECOLOGY

## 2023-12-13 PROCEDURE — 88305 TISSUE EXAM BY PATHOLOGIST: CPT | Performed by: PATHOLOGY

## 2023-12-13 NOTE — PROGRESS NOTES
Endometrial biopsy    Date/Time: 12/13/2023 9:30 AM    Performed by: Jaskaran Urbano MD  Authorized by: Jaskaran Urbano MD  Universal Protocol:  Consent: Verbal consent obtained. Written consent obtained. Patient understanding: patient states understanding of the procedure being performed  Patient consent: the patient's understanding of the procedure matches consent given  Patient identity confirmed: verbally with patient    Indication:     Indications:  Other disorder of menstruation and other abnormal bleeding from female genital tract      Indications comment:  EH with atypia (EIN)  Procedure:     A bivalve speculum was placed in the vagina: yes      Cervix cleaned and prepped: yes      Local anesthetic:  Benzocaine spray    The cervix was dilated: no      Uterus sounded: yes      Uterus sound depth (cm):  9    Curettes used:  1    Specimen collected: specimen collected and sent to pathology

## 2023-12-18 PROCEDURE — 88305 TISSUE EXAM BY PATHOLOGIST: CPT | Performed by: PATHOLOGY

## 2024-02-01 DIAGNOSIS — I10 PRIMARY HYPERTENSION: ICD-10-CM

## 2024-02-01 RX ORDER — DILTIAZEM HYDROCHLORIDE 180 MG/1
360 CAPSULE, COATED, EXTENDED RELEASE ORAL DAILY
Qty: 60 CAPSULE | Refills: 5 | Status: SHIPPED | OUTPATIENT
Start: 2024-02-01

## 2024-02-02 DIAGNOSIS — F41.9 ANXIETY: ICD-10-CM

## 2024-02-02 RX ORDER — CITALOPRAM 20 MG/1
20 TABLET ORAL DAILY
Qty: 90 TABLET | Refills: 1 | Status: SHIPPED | OUTPATIENT
Start: 2024-02-02

## 2024-03-18 ENCOUNTER — TELEPHONE (OUTPATIENT)
Age: 59
End: 2024-03-18

## 2024-03-18 NOTE — TELEPHONE ENCOUNTER
Breakthrough bleeding over the weekend; Scheduled to see Dr. Parr 4/3/24 to discuss Hysterectomy; Doubling up on Progesterone which is helping per Dr. Parr's previous recommendations. Slight bleeding now, pink in color; has mild cramping. Requesting RN updates Dr. Parr. No further questions at this time. RN routed note to provider per pt request.

## 2024-03-18 NOTE — TELEPHONE ENCOUNTER
RN received message from Dr. Parr confirming pt should continue taking progesterone, and will discuss next step in course of treatment at follow up visit on 4/3/24. RN placed a call to pt with update. Pt verbalized an understanding. No further questions.

## 2024-03-19 LAB — TSH SERPL-ACNC: 1.22 UIU/ML (ref 0.45–5.33)

## 2024-03-20 ENCOUNTER — OFFICE VISIT (OUTPATIENT)
Dept: FAMILY MEDICINE CLINIC | Facility: CLINIC | Age: 59
End: 2024-03-20

## 2024-03-20 VITALS
DIASTOLIC BLOOD PRESSURE: 82 MMHG | OXYGEN SATURATION: 99 % | HEIGHT: 66 IN | HEART RATE: 72 BPM | BODY MASS INDEX: 38.92 KG/M2 | SYSTOLIC BLOOD PRESSURE: 124 MMHG | TEMPERATURE: 98 F | WEIGHT: 242.2 LBS

## 2024-03-20 DIAGNOSIS — Z12.11 SCREENING FOR COLON CANCER: ICD-10-CM

## 2024-03-20 DIAGNOSIS — N95.0 POSTMENOPAUSAL BLEEDING: Primary | ICD-10-CM

## 2024-03-20 DIAGNOSIS — F33.0 MILD EPISODE OF RECURRENT MAJOR DEPRESSIVE DISORDER (HCC): ICD-10-CM

## 2024-03-20 DIAGNOSIS — I10 PRIMARY HYPERTENSION: ICD-10-CM

## 2024-03-20 DIAGNOSIS — I48.91 ATRIAL FIBRILLATION, UNSPECIFIED TYPE (HCC): ICD-10-CM

## 2024-03-20 DIAGNOSIS — E78.2 MIXED HYPERLIPIDEMIA: ICD-10-CM

## 2024-03-20 DIAGNOSIS — E03.9 ACQUIRED HYPOTHYROIDISM: ICD-10-CM

## 2024-03-20 PROCEDURE — 99213 OFFICE O/P EST LOW 20 MIN: CPT | Performed by: NURSE PRACTITIONER

## 2024-03-20 RX ORDER — THYROID 90 MG/1
45 TABLET ORAL DAILY
COMMUNITY

## 2024-03-20 NOTE — PROGRESS NOTES
Name: Lenka Wayne      : 1965      MRN: [id number]  Encounter Provider: PIERRE Mclaughlin  Encounter Date: 3/20/2024   Encounter department: [facility] PRIMARY [facility]    Assessment & Plan     1. Need for hepatitis C screening test    2. Screening for HIV (human immunodeficiency virus)    3. Postmenopausal bleeding    4. Acquired hypothyroidism  Assessment & Plan:  Currently taking NP thyroid 45 mg.        5. Atrial fibrillation, unspecified type (HCC)  Assessment & Plan:  Stable, currently on cardizem      6. Primary hypertension  Assessment & Plan:  BP in normal parameters       7. Mixed hyperlipidemia  Assessment & Plan:  Remains on statin.              Subjective      Here today for a follow-up.  Reports a recent hx of post menopausal bleeding.  She is already followed by GYN and will be seeing them again in 2 weeks.  Currently on Progesterone and has doubled the dose of it.            Review of Systems   Constitutional: Negative.    HENT: Negative.     Respiratory: Negative.     Cardiovascular: Negative.    Gastrointestinal: Negative.    Genitourinary:  Positive for menstrual problem.   Neurological: Negative.    All other systems reviewed and are negative.      Current Outpatient Medications on File Prior to Visit   Medication Sig    atorvastatin (LIPITOR) 10 mg tablet Take 1 tablet (10 mg total) by mouth in the morning    B Complex Vitamins (Vitamin B Complex) TABS Take 2 tablets by mouth    Barberry-Oreg Grape-Goldenseal 200-200-50 MG CAPS Take 500 mg by mouth    cholecalciferol (VITAMIN D3) 1,000 units tablet Take 10,000 Units by mouth daily    citalopram (CeleXA) 20 mg tablet TAKE ONE TABLET BY MOUTH EVERY MORNING    co-enzyme Q-10 30 MG capsule Take 100 mg by mouth 2 (two) times a day    Digestive Enzymes (SIMILASE PO) Take 1 capsule by mouth 2 (two) times a day    diltiazem (CARDIZEM CD) 180 mg 24 hr capsule TAKE TWO CAPSULES BY MOUTH DAILY    Magnesium 400 MG CAPS Take by  "mouth    Milk Thistle 500 MG CAPS Take by mouth    norethindrone (AYGESTIN) 5 mg tablet TAKE TWO TABLETS BY MOUTH DAILY    Omega-3 Fatty Acids (fish oil) 1,000 mg Take 1,000 mg by mouth daily    thyroid (NP Thyroid) 90 MG tablet Take 45 mg by mouth daily    Lactobacillus TABS Take 1 tablet by mouth (Patient not taking: Reported on 3/20/2024)    [DISCONTINUED] NP Thyroid 90 MG tablet TAKE ONE TABLET BY MOUTH EVERY MORNING (Patient not taking: Reported on 3/20/2024)       Objective     /82 (BP Location: Left arm, Patient Position: Sitting, Cuff Size: Extra-Large)   Pulse 72   Temp 98 °F (36.7 °C)   Ht 5' 6\" (1.676 m)   Wt 110 kg (242 lb 3.2 oz)   SpO2 99%   BMI 39.09 kg/m²     Physical Exam  Constitutional:       Appearance: Normal appearance.   Cardiovascular:      Rate and Rhythm: Normal rate and regular rhythm.   Pulmonary:      Effort: Pulmonary effort is normal.      Breath sounds: Normal breath sounds.   Neurological:      Mental Status: She is alert.   Psychiatric:         Mood and Affect: Mood normal.         Behavior: Behavior normal.         Thought Content: Thought content normal.         Judgment: Judgment normal.       PIERRE Mclaughlin    "

## 2024-03-20 NOTE — ASSESSMENT & PLAN NOTE
Recent breakthrough bleeding.  To see GYN in 2 weeks.  Had a hysteroscopy in 09/2023 due to inadequate pap smear in July 2023.  Currently on progesterone and just doubled it due to the bleeding per Gyn.

## 2024-03-21 ENCOUNTER — TELEPHONE (OUTPATIENT)
Dept: ADMINISTRATIVE | Facility: OTHER | Age: 59
End: 2024-03-21

## 2024-03-21 NOTE — TELEPHONE ENCOUNTER
Upon review of the In Basket request we were able to locate, review, and update the patient chart as requested for Hepatitis C .    Any additional questions or concerns should be emailed to the Practice Liaisons via the appropriate education email address, please do not reply via In Basket.    Thank you  Yaritza Allred

## 2024-03-21 NOTE — TELEPHONE ENCOUNTER
----- Message from PIERRE Mclaughlin sent at 3/20/2024  3:42 PM EDT -----  Regarding: Hep C  Hello, our patient attached above has had    Hep C             completed/performed. Please assist in updating the patient chart by pulling the Care Everywhere (CE) document. The date of service is 08/27/2018      Thanks!    Tanya

## 2024-03-25 DIAGNOSIS — N85.00 ENDOMETRIAL HYPERPLASIA WITHOUT ATYPIA: ICD-10-CM

## 2024-04-02 NOTE — PROGRESS NOTES
Assessment/Plan:  Problem List Items Addressed This Visit          Genitourinary    Endometrial hyperplasia without atypia - Primary     Discussed options of continued aygestin, IUD, or hysterectomy. We discussed risks, benefits, and alternatives of each. She was initially leaning towards definitive management with hysterectomy, but just got a new job and is unable to take more than 2 weeks off work. We discussed that since her bleeding is stable on 20mg of aygestin, there is no urgency to perform hysterectomy if she is able to be compliant with q6 month biopsies on progesterone based treatment. We discussed risk factors of nulliparity and class 2 obesity for endometrial hyperplasia and endometrial cancer.     At this time she would like to decrease Aygestin to 15mg daily, talk to her employer about getting adequate time off for post op recovery, repeat TVUS, and RTO in  for repeat endometrial biopsy. Would like to do 2025 for surgery.         Relevant Medications    norethindrone (AYGESTIN) 5 mg tablet    Other Relevant Orders    US pelvis complete w transvaginal       Obstetrics/Gynecology    Postmenopausal bleeding    Relevant Orders    US pelvis complete w transvaginal    Thickened endometrium    Relevant Orders    US pelvis complete w transvaginal      Patient currently not bleeding at this time.  Discussed with patient previous biopsy results that showed no hyperplasia.  Follow-up in  as planned for repeat biopsy.        Subjective   Patient ID: Lenka Wayne is a 58 y.o. female.    Patient is here for a problem visit.    Chief Complaint   Patient presents with    Follow-up     2 weeks ago had breakthrough bleeding.      She is on 20 mg daily for a few weeks and has stopped bleeding.  She was having breakthrough bleeding at 10 mg/day.  She has cramping.  She has some breast tenderness. No headache/mood changes.  She reports no side effects.      Menstrual History:  OB History           0    Para   0    Term   0       0    AB   0    Living   0         SAB   0    IAB   0    Ectopic   0    Multiple   0    Live Births   0                  Past Medical History:   Diagnosis Date    Atrial fibrillation (HCC) 10/2020    CPAP (continuous positive airway pressure) dependence     Disease of thyroid gland     Hypertension     Sleep apnea        Past Surgical History:   Procedure Laterality Date    BREAST BIOPSY Left     stereotactic bx - benign    KNEE SURGERY Left 2023    [place] HYSTEROSCOPY BX ENDOMETRIUM&/POLYPC W/WO D&C N/A 2023    Procedure: (D&C) W/ HYSTEROSCOPY;  Surgeon: Barbara Parr MD;  Location: [facility];  Service: Gynecology       Social History     Tobacco Use    Smoking status: Never     Passive exposure: Never    Smokeless tobacco: Never   Vaping Use    Vaping status: Never Used   Substance Use Topics    Alcohol use: Not Currently    Drug use: Never        Allergies   Allergen Reactions    Food Other (See Comments)     Wheat GI upset; joint inflammation    Other Other (See Comments)     ENVIRONMENTAL ALLERGENS. Pollen, dust, mold : sneezing  PLANTS. Sneezing  PET DANDER. Sneezing      Penicillins Other (See Comments)     Family hx of allergy to penicillin    Wheat Bran - Food Allergy Abdominal Pain    Horse-Derived Products Rash     Horse serum         Current Outpatient Medications:     norethindrone (AYGESTIN) 5 mg tablet, Take 3 tablets (15 mg total) by mouth daily, Disp: 90 tablet, Rfl: 5    atorvastatin (LIPITOR) 10 mg tablet, Take 1 tablet (10 mg total) by mouth in the morning, Disp: 90 tablet, Rfl: 1    B Complex Vitamins (Vitamin B Complex) TABS, Take 2 tablets by mouth, Disp: , Rfl:     Barberry-Oreg Grape-Goldenseal 200-200-50 MG CAPS, Take 500 mg by mouth, Disp: , Rfl:     cholecalciferol (VITAMIN D3) 1,000 units tablet, Take 10,000 Units by mouth daily, Disp: , Rfl:     citalopram (CeleXA) 20 mg tablet, TAKE ONE TABLET BY MOUTH EVERY MORNING, Disp: 90 tablet,  "Rfl: 1    co-enzyme Q-10 30 MG capsule, Take 100 mg by mouth 2 (two) times a day, Disp: , Rfl:     Digestive Enzymes (SIMILASE PO), Take 1 capsule by mouth 2 (two) times a day, Disp: , Rfl:     diltiazem (CARDIZEM CD) 180 mg 24 hr capsule, TAKE TWO CAPSULES BY MOUTH DAILY, Disp: 60 capsule, Rfl: 5    Lactobacillus TABS, Take 1 tablet by mouth (Patient not taking: Reported on 3/20/2024), Disp: , Rfl:     Magnesium 400 MG CAPS, Take by mouth, Disp: , Rfl:     Milk Thistle 500 MG CAPS, Take by mouth, Disp: , Rfl:     Omega-3 Fatty Acids (fish oil) 1,000 mg, Take 1,000 mg by mouth daily, Disp: , Rfl:     thyroid (NP Thyroid) 90 MG tablet, Take 45 mg by mouth daily, Disp: , Rfl:           /80 (BP Location: Right arm, Patient Position: Sitting, Cuff Size: Large)   Pulse 78   Ht 5' 6\" (1.676 m)   Wt 111 kg (245 lb)   SpO2 98%   BMI 39.54 kg/m²       Physical Exam  Constitutional:       Appearance: She is obese.   Genitourinary:      Vulva normal.      Genitourinary Comments: Small rectocele  Normal cervix with well estrogenized vagina  No bleeding seen on exam  No cystocele  Nulliparous cervix   HENT:      Head: Normocephalic.   Cardiovascular:      Rate and Rhythm: Normal rate.   Abdominal:      General: Abdomen is flat.      Palpations: Abdomen is soft.   Neurological:      Mental Status: She is oriented to person, place, and time.   Skin:     General: Skin is warm.   Psychiatric:         Mood and Affect: Mood normal.         Behavior: Behavior normal.             I have spent a total time of 30 minutes on 04/03/24 in caring for this patient including Diagnostic results, Prognosis, Risks and benefits of tx options, Instructions for management, Patient and family education, Importance of tx compliance, Risk factor reductions, Impressions, Counseling / Coordination of care, Documenting in the medical record, Reviewing / ordering tests, medicine, procedures  , Obtaining or reviewing history  , and Communicating " with other healthcare professionals .       Future Appointments   Date Time Provider Department Center   6/12/2024 10:30 AM Barbara Parr MD Complete  [facility]   9/25/2024 11:45 AM PIERRE Mclaughlin  PCP [place]   11/11/2024  4:00 PM [facility]

## 2024-04-03 ENCOUNTER — OFFICE VISIT (OUTPATIENT)
Dept: OBGYN CLINIC | Facility: CLINIC | Age: 59
End: 2024-04-03
Payer: COMMERCIAL

## 2024-04-03 VITALS
DIASTOLIC BLOOD PRESSURE: 80 MMHG | OXYGEN SATURATION: 98 % | SYSTOLIC BLOOD PRESSURE: 132 MMHG | BODY MASS INDEX: 39.37 KG/M2 | WEIGHT: 245 LBS | HEIGHT: 66 IN | HEART RATE: 78 BPM

## 2024-04-03 DIAGNOSIS — R93.89 THICKENED ENDOMETRIUM: ICD-10-CM

## 2024-04-03 DIAGNOSIS — N85.00 ENDOMETRIAL HYPERPLASIA WITHOUT ATYPIA: Primary | ICD-10-CM

## 2024-04-03 DIAGNOSIS — N95.0 POSTMENOPAUSAL BLEEDING: ICD-10-CM

## 2024-04-03 PROBLEM — Z98.890 S/P DILATION AND CURETTAGE: Status: RESOLVED | Noted: 2023-09-07 | Resolved: 2024-04-03

## 2024-04-03 PROCEDURE — 99214 OFFICE O/P EST MOD 30 MIN: CPT | Performed by: OBSTETRICS & GYNECOLOGY

## 2024-04-03 NOTE — ASSESSMENT & PLAN NOTE
Discussed options of continued aygestin, IUD, or hysterectomy. We discussed risks, benefits, and alternatives of each. She was initially leaning towards definitive management with hysterectomy, but just got a new job and is unable to take more than 2 weeks off work. We discussed that since her bleeding is stable on 20mg of aygestin, there is no urgency to perform hysterectomy if she is able to be compliant with q6 month biopsies on progesterone based treatment. We discussed risk factors of nulliparity and class 2 obesity for endometrial hyperplasia and endometrial cancer.     At this time she would like to decrease Aygestin to 15mg daily, talk to her employer about getting adequate time off for post op recovery, repeat TVUS, and RTO in June for repeat endometrial biopsy. Would like to do Jan 2025 for surgery.

## 2024-04-03 NOTE — ASSESSMENT & PLAN NOTE
Discussed options of continued aygestin, IUD, or hysterectomy. We discussed risks, benefits, and alternatives of each. She was initially leaning towards definitive management with hysterectomy, but just got a new job and is unable to take more than 2 weeks off work. We discussed that since her bleeding is stable on 20mg of aygestin, there is no urgency to perform hysterectomy if she is able to be compliant with q6 month biopsies on progesterone based treatment. We discussed risk factors of nulliparity and class 2 obesity for endometrial hyperplasia and endometrial cancer.

## 2024-04-13 DIAGNOSIS — E03.9 ACQUIRED HYPOTHYROIDISM: Primary | ICD-10-CM

## 2024-04-14 RX ORDER — THYROID 90 MG/1
90 TABLET ORAL EVERY MORNING
Qty: 30 TABLET | Refills: 1 | Status: SHIPPED | OUTPATIENT
Start: 2024-04-14

## 2024-05-01 ENCOUNTER — HOSPITAL ENCOUNTER (OUTPATIENT)
Dept: ULTRASOUND IMAGING | Facility: HOSPITAL | Age: 59
Discharge: HOME | End: 2024-05-01
Attending: OBSTETRICS & GYNECOLOGY
Payer: COMMERCIAL

## 2024-05-01 DIAGNOSIS — N85.00 ENDOMETRIAL HYPERPLASIA WITHOUT ATYPIA: ICD-10-CM

## 2024-05-01 DIAGNOSIS — R93.89 THICKENED ENDOMETRIUM: ICD-10-CM

## 2024-05-01 DIAGNOSIS — N95.0 POSTMENOPAUSAL BLEEDING: ICD-10-CM

## 2024-05-01 PROCEDURE — 76830 TRANSVAGINAL US NON-OB: CPT

## 2024-05-01 PROCEDURE — 76856 US EXAM PELVIC COMPLETE: CPT

## 2024-05-08 ENCOUNTER — TELEPHONE (OUTPATIENT)
Age: 59
End: 2024-05-08

## 2024-05-08 NOTE — TELEPHONE ENCOUNTER
Adalgisa called from [facility] in [facility] to verify results with Significant Findings crossed over to pt's chart from 5/1/24. Tried to warm transfer no CTS available at the moment.

## 2024-05-09 ENCOUNTER — TELEPHONE (OUTPATIENT)
Age: 59
End: 2024-05-09

## 2024-05-09 DIAGNOSIS — E78.2 MIXED HYPERLIPIDEMIA: ICD-10-CM

## 2024-05-09 RX ORDER — ATORVASTATIN CALCIUM 10 MG/1
10 TABLET, FILM COATED ORAL DAILY
Qty: 30 TABLET | Refills: 0 | Status: SHIPPED | OUTPATIENT
Start: 2024-05-09

## 2024-05-09 NOTE — TELEPHONE ENCOUNTER
----- Message from Barbara Parr MD sent at 5/9/2024  2:47 PM EDT -----  Please call the patient regarding her abnormal result.  Her ultrasound still shows thickened endometrium.  Since endometrial biopsy is planned for June at her next follow-up visit, I could perform it at that time  Her uterine fibroid also appears to be slightly larger compared to previous.  Keep her regularly scheduled appointment for June,

## 2024-06-06 DIAGNOSIS — E78.2 MIXED HYPERLIPIDEMIA: ICD-10-CM

## 2024-06-07 RX ORDER — ATORVASTATIN CALCIUM 10 MG/1
10 TABLET, FILM COATED ORAL DAILY
Qty: 30 TABLET | Refills: 5 | Status: SHIPPED | OUTPATIENT
Start: 2024-06-07

## 2024-06-11 NOTE — PROGRESS NOTES
Assessment/Plan:    Problem List Items Addressed This Visit          Genitourinary    Endometrial hyperplasia without atypia - Primary    Relevant Orders    Ambulatory Referral to Gynecologic Oncology    Tissue Exam     Other Visit Diagnoses       Intramural leiomyoma of uterus        Relevant Orders    Ambulatory Referral to Gynecologic Oncology          Patient with endometrial hyperplasia that has shown resolution with repeat endometrial biopsy.  She is currently maintained on Aygestin 15 mg daily.  She is currently not bleeding at this time.  Repeat endometrial biopsy was performed today.  Discussed with patient pelvic ultrasound results that showed thickened endometrium and enlarging uterine fibroid/myometrial mass.    She is now requesting a hysterectomy for definitive management.    Referred to gynecologic oncology due to history of known atypical hyperplasia as well as enlarging uterine fibroids/myometrial mass for definitive surgery/hysterectomy.    We will call patient with endometrial biopsy results.    Subjective   Patient ID: Lenka Wayne is a 59 y.o. female.  Patient is here for a follow-up.    Chief Complaint   Patient presents with    Procedure     EMB   Patient with history of hysteroscopy and D&C in 2023 showing not atypical endometrial hyperplasia involving polyp.  She declined definitive treatment with a hysterectomy and was started on progesterone therapy.  Repeat endometrial biopsy in 2023 was benign.  She returned in April to discuss options again for treatment.  She continued to have some bleeding with 10 mg of Aygestin and was started on 15 mg daily.  A repeat ultrasound was ordered for patient and she was advised to return for repeat endometrial biopsy.      Menstrual History:  OB History          0    Para   0    Term   0       0    AB   0    Living   0         SAB   0    IAB   0    Ectopic   0    Multiple   0    Live Births   0              No  LMP recorded. Patient is postmenopausal.         Past Medical History:   Diagnosis Date    Atrial fibrillation (HCC) 10/2020    CPAP (continuous positive airway pressure) dependence     Disease of thyroid gland     Hypertension     Sleep apnea        Past Surgical History:   Procedure Laterality Date    BREAST BIOPSY Left 2012    stereotactic bx - benign    KNEE SURGERY Left 02/17/2023    DE HYSTEROSCOPY BX ENDOMETRIUM&/POLYPC W/WO D&C N/A 9/8/2023    Procedure: (D&C) W/ HYSTEROSCOPY;  Surgeon: Barbara Parr MD;  Location: [facility];  Service: Gynecology       Social History     Tobacco Use    Smoking status: Never     Passive exposure: Never    Smokeless tobacco: Never   Vaping Use    Vaping status: Never Used   Substance Use Topics    Alcohol use: Not Currently    Drug use: Never       Allergies   Allergen Reactions    Food Other (See Comments)     Wheat GI upset; joint inflammation    Other Other (See Comments)     ENVIRONMENTAL ALLERGENS. Pollen, dust, mold : sneezing  PLANTS. Sneezing  PET DANDER. Sneezing      Penicillins Other (See Comments)     Family hx of allergy to penicillin    Wheat Bran - Food Allergy Abdominal Pain    Horse-Derived Products Rash     Horse serum         Current Outpatient Medications:     atorvastatin (LIPITOR) 10 mg tablet, TAKE ONE TABLET BY MOUTH EVERY MORNING, Disp: 30 tablet, Rfl: 5    B Complex Vitamins (Vitamin B Complex) TABS, Take 2 tablets by mouth, Disp: , Rfl:     Barberry-Oreg Grape-Goldenseal 200-200-50 MG CAPS, Take 500 mg by mouth, Disp: , Rfl:     cholecalciferol (VITAMIN D3) 1,000 units tablet, Take 10,000 Units by mouth daily, Disp: , Rfl:     citalopram (CeleXA) 20 mg tablet, TAKE ONE TABLET BY MOUTH EVERY MORNING, Disp: 90 tablet, Rfl: 1    co-enzyme Q-10 30 MG capsule, Take 100 mg by mouth 2 (two) times a day, Disp: , Rfl:     Digestive Enzymes (SIMILASE PO), Take 1 capsule by mouth 2 (two) times a day, Disp: , Rfl:     diltiazem (CARDIZEM CD) 180 mg 24 hr  "capsule, TAKE TWO CAPSULES BY MOUTH DAILY, Disp: 60 capsule, Rfl: 5    Lactobacillus TABS, Take 1 tablet by mouth (Patient not taking: Reported on 3/20/2024), Disp: , Rfl:     Magnesium 400 MG CAPS, Take by mouth, Disp: , Rfl:     Milk Thistle 500 MG CAPS, Take by mouth, Disp: , Rfl:     norethindrone (AYGESTIN) 5 mg tablet, Take 3 tablets (15 mg total) by mouth daily, Disp: 90 tablet, Rfl: 5    Omega-3 Fatty Acids (fish oil) 1,000 mg, Take 1,000 mg by mouth daily, Disp: , Rfl:     thyroid (ARMOUR) 90 MG tablet, TAKE ONE TABLET BY MOUTH IN THE MORNING, Disp: 30 tablet, Rfl: 1    Pertinent laboratory testing, imaging studies and prior external records reviewed    Review of Systems   Constitutional: Negative.    HENT: Negative.     Eyes: Negative.    Respiratory: Negative.     Cardiovascular: Negative.    Gastrointestinal: Negative.    Endocrine: Negative.    Genitourinary:         As noted in HPI   Musculoskeletal: Negative.    Skin: Negative.    Allergic/Immunologic: Negative.    Neurological: Negative.    Hematological: Negative.    Psychiatric/Behavioral: Negative.           /80   Pulse 65   Ht 5' 6\" (1.676 m)   Wt 109 kg (240 lb 3.2 oz)   SpO2 99%   BMI 38.77 kg/m²       Physical Exam  Constitutional:       General: She is not in acute distress.     Appearance: She is well-developed.   Genitourinary:      Bladder and urethral meatus normal.      No lesions in the vagina.      Right Labia: No rash, tenderness, lesions, skin changes or Bartholin's cyst.     Left Labia: No tenderness, lesions, skin changes, Bartholin's cyst or rash.     No vaginal discharge, erythema, tenderness or bleeding.      No vaginal prolapse present.     No vaginal atrophy present.       Right Adnexa: not tender, not full and no mass present.     Left Adnexa: not tender, not full and no mass present.     No cervical polyp.      Uterus is not enlarged or tender.      No uterine mass detected.     Pelvic exam was performed with " patient in the lithotomy position.   Rectum:      No tenderness.   Cardiovascular:      Rate and Rhythm: Normal rate.   Pulmonary:      Effort: Pulmonary effort is normal.   Abdominal:      General: There is no distension.      Palpations: Abdomen is soft.      Tenderness: There is no abdominal tenderness.   Neurological:      Mental Status: She is alert and oriented to person, place, and time.   Skin:     General: Skin is warm and dry.   Psychiatric:         Behavior: Behavior normal.               Future Appointments   Date Time Provider Department [place]   9/25/2024 11:45 AM PIERRE Mclaughlin  PCP [place]   11/11/2024  4:00 PM [facility]

## 2024-06-12 ENCOUNTER — PROCEDURE VISIT (OUTPATIENT)
Dept: OBGYN CLINIC | Facility: CLINIC | Age: 59
End: 2024-06-12
Payer: COMMERCIAL

## 2024-06-12 ENCOUNTER — DOCUMENTATION (OUTPATIENT)
Dept: GYNECOLOGIC ONCOLOGY | Facility: CLINIC | Age: 59
End: 2024-06-12

## 2024-06-12 VITALS
BODY MASS INDEX: 38.6 KG/M2 | OXYGEN SATURATION: 99 % | SYSTOLIC BLOOD PRESSURE: 122 MMHG | WEIGHT: 240.2 LBS | HEIGHT: 66 IN | HEART RATE: 65 BPM | DIASTOLIC BLOOD PRESSURE: 80 MMHG

## 2024-06-12 DIAGNOSIS — N85.00 ENDOMETRIAL HYPERPLASIA WITHOUT ATYPIA: Primary | ICD-10-CM

## 2024-06-12 DIAGNOSIS — D25.1 INTRAMURAL LEIOMYOMA OF UTERUS: ICD-10-CM

## 2024-06-12 PROCEDURE — 58100 BIOPSY OF UTERUS LINING: CPT | Performed by: OBSTETRICS & GYNECOLOGY

## 2024-06-12 PROCEDURE — 99213 OFFICE O/P EST LOW 20 MIN: CPT | Performed by: OBSTETRICS & GYNECOLOGY

## 2024-06-12 PROCEDURE — 88305 TISSUE EXAM BY PATHOLOGIST: CPT | Performed by: PATHOLOGY

## 2024-06-12 NOTE — PROGRESS NOTES
Endometrial biopsy    Date/Time: 6/12/2024 10:30 AM    Performed by: Barbara Parr MD  Authorized by: Barbara Parr MD  Universal Protocol:  Procedure performed by:  Consent: Verbal consent obtained. Written consent obtained.  Consent given by: patient  Patient understanding: patient states understanding of the procedure being performed  Patient consent: the patient's understanding of the procedure matches consent given  Procedure consent: procedure consent matches procedure scheduled  Relevant documents: relevant documents present and verified  Test results: test results available and properly labeled    Indication:     Indications comment:  Endometrial biopsy  Procedure:     Procedure: endometrial biopsy with [place]      A bivalve speculum was placed in the vagina: yes      Cervix cleaned and prepped: yes      Local anesthetic:  Benzocaine spray    Uterus sounded: yes      Uterus sound depth (cm):  10    Curettes used:  1    Specimen collected: specimen collected and sent to pathology

## 2024-06-12 NOTE — PROGRESS NOTES
Chart rvw'd on 2024      Referred by:  Dr. Barbara Parr    Diagnosis:  Endometrial hyperplasia without atypia  Intramural leiomyoma of uterus    Imagin2024 US pelvis complete w transvaginal-  The endometrial stripe is slightly thicker measuring 8 mm and should be correlated with any continued postmenopausal bleeding. Hyperplastic change or other etiologies is not excluded. Endometrial biopsy could be considered if not performed recently.     Dominant 9.4 cm myometrial mass has mildly enlarged in size and by report measured 6.4 cm in 2020. This most likely represents a fibroid but nonspecific in appearance. If no intervention is performed, continued surveillance is advised in 3 to 6 months   time.      Pathology:  Endometrial biopsy pending, taken today 2024      Surgery:  N/A      Post surgical pathology:  N/A      60 yo F seen by obgyn for hx of endometrial hyperplasia w resolution. Repeat endometrial biopsy performed today and pending results. US of the pelvis shows thickened endometrium and a 9.4 cm myometrial mass that has increased in size from 2020. Pt is interested in hysterectomy. She has been referred to gyn onc.

## 2024-06-13 DIAGNOSIS — D25.1 INTRAMURAL LEIOMYOMA OF UTERUS: Primary | ICD-10-CM

## 2024-06-14 ENCOUNTER — TELEPHONE (OUTPATIENT)
Dept: HEMATOLOGY ONCOLOGY | Facility: CLINIC | Age: 59
End: 2024-06-14

## 2024-06-14 NOTE — TELEPHONE ENCOUNTER
I called Lenka in response to a referral that was received for patient to establish care with Gynecologic Oncology.     Outreach was made to schedule a consultation.    I left a voicemail explaining the reason for my call and advised patient to call [facility] at [phone].  Another attempt will be made to contact patient.    Schedule patient within 2 weeks.

## 2024-06-17 PROCEDURE — 88305 TISSUE EXAM BY PATHOLOGIST: CPT | Performed by: PATHOLOGY

## 2024-07-01 ENCOUNTER — OFFICE VISIT (OUTPATIENT)
Dept: GYNECOLOGIC ONCOLOGY | Facility: CLINIC | Age: 59
End: 2024-07-01
Payer: COMMERCIAL

## 2024-07-01 VITALS
WEIGHT: 241 LBS | DIASTOLIC BLOOD PRESSURE: 70 MMHG | RESPIRATION RATE: 18 BRPM | TEMPERATURE: 98.5 F | HEART RATE: 74 BPM | OXYGEN SATURATION: 98 % | HEIGHT: 66 IN | SYSTOLIC BLOOD PRESSURE: 140 MMHG | BODY MASS INDEX: 38.73 KG/M2

## 2024-07-01 DIAGNOSIS — D25.1 INTRAMURAL LEIOMYOMA OF UTERUS: ICD-10-CM

## 2024-07-01 DIAGNOSIS — N85.00 ENDOMETRIAL HYPERPLASIA WITHOUT ATYPIA: Primary | ICD-10-CM

## 2024-07-01 PROCEDURE — 99245 OFF/OP CONSLTJ NEW/EST HI 55: CPT | Performed by: OBSTETRICS & GYNECOLOGY

## 2024-07-01 RX ORDER — CEFAZOLIN SODIUM 2 G/50ML
2000 SOLUTION INTRAVENOUS ONCE
OUTPATIENT
Start: 2024-07-01 | End: 2024-07-01

## 2024-07-01 RX ORDER — ENOXAPARIN SODIUM 100 MG/ML
40 INJECTION SUBCUTANEOUS
OUTPATIENT
Start: 2024-07-01 | End: 2024-07-02

## 2024-07-01 RX ORDER — METRONIDAZOLE 500 MG/100ML
500 INJECTION, SOLUTION INTRAVENOUS ONCE
OUTPATIENT
Start: 2024-07-01 | End: 2024-07-01

## 2024-07-01 NOTE — PROGRESS NOTES
Assessment/Plan:    Problem List Items Addressed This Visit       Endometrial hyperplasia without atypia - Primary     The patient has a new diagnosis of complex endometrial hyperplasia with atypia by her most recent biopsy.  We have discussed with the patient that there may be as much as 5% likelihood of having a concurrent endometrial cancer.  We have discussed treatment options for this disease including surgery and hormonal management.  I have stated that medical evidence indicates that at this point to surgical management is her best option. I have discussed also the risks and benefits of lymph node biopsy at the time of surgery.  We have discussed open versus laparoscopic versus robotic approach and have recommended the following:    Robotically assisted total laparoscopic hysterectomy bilateral salpingo-oophorectomy with possible pelvic and periaortic lymph node biopsy.  Have discussed risks and benefits of the procedure including bleeding requiring transfusion infection, infection, damage to local structures including bowel bladder ureter and other local organs.  We discussed the risk of deep venous thrombosis.  All of these complications are in the 2-4% range of likelihood.  An open procedure may be required.  The patient understands the risks and benefits of the procedure and has signed an informed consent.  I personally signed the consent form with her.  She does understand that further treatment including chemotherapy radiation therapy or hormones may be required based on the final postoperative pathologic diagnosis and staging.  Standard preoperative testing including type and screen is CBC CPM P chest x-ray and EKG will be ordered.  Any appropriate consultations for preoperative evaluation will also be ordered.  Overall consultation took 60 min with greater than 50% in dedicated toward discussion time.          Relevant Orders    Case request operating room: HYSTERECTOMY LAPAROSCOPIC TOTAL (LTH) W/  ROBOTICS bilateral salpingo-oophorectomy (Completed)    Type and screen    TSH    Comprehensive metabolic panel    CBC and differential    EKG 12 lead    XR chest pa & lateral    Intramural leiomyoma of uterus    Relevant Orders    Case request operating room: HYSTERECTOMY LAPAROSCOPIC TOTAL (LTH) W/ ROBOTICS bilateral salpingo-oophorectomy (Completed)    Type and screen    TSH    Comprehensive metabolic panel    CBC and differential    EKG 12 lead    XR chest pa & lateral           CHIEF COMPLAINT: Endometrial hyperplasia with atypia        Patient ID: Lenka Wayne is a 59 y.o. female  Patient presents for evaluation and management of reported endometrial hyperplasia postmenopausal bleeding.      The patient underwent a D&C hysteroscopy in September 2023 which revealed endometrial hyperplasia without atypia.  The patient was treated with progesterone and has undergone several biopsiesreveal only normal progesterone treated endometrium.  Her most recent biopsy in June 2024 was insufficient.  The patient has recently undergone a pelvic ultrasound which reveals the following:  The patient has been treated with progesterone in the form of Aygestin 15 mg p.o. daily.    The patient continues to have bleeding and was noted to have an enlarging fibroid on ultrasound including the following:      PELVIC ULTRASOUND, COMPLETE     INDICATION: The patient is 59 years old. N85.00: Endometrial hyperplasia, unspecified  N95.0: Postmenopausal bleeding  R93.89: Abnormal findings on diagnostic imaging of other specified body structures. Patient on oral hormone therapy; considering hysterectomy according to the clinical note.     COMPARISON: Ultrasound from 6/16/2023 70: Report from 1/13/2020 ultrasound which described a 4.6 x 3.7 x 6.4 cm fibroid. Endometrial stripe is 6 mm at that time.     TECHNIQUE: Transabdominal pelvic ultrasound was performed in sagittal and transverse planes with a curvilinear transducer. Additional  transvaginal imaging was performed to better evaluate the endometrium and ovaries. Imaging included volumetric sweeps as   well as traditional still imaging technique.     FINDINGS:     UTERUS:  The uterus is anteverted in position, measuring 13.3 x 8.3 x 6.4 cm.  Dominant myometrial mass measures 6.8 x 7.2 x 9.4 cm and is exophytic anteriorly, previously 5.8 x 5.6 x 7.3 cm. This is mildly vascular on color flow Doppler.  Second nodule measures 2.2 x 1.7 x 2.4 cm in the right submucosal fundal region.     These most likely represent fibroids although the appearance is nonspecific and other etiologies are possible.  The cervix appears within normal limits.     ENDOMETRIUM:  The endometrial echo complex has an AP caliber of 8.0 mm, previously 6 mm.  The endometrium is slightly heterogeneous and mildly displaced by the myometrial mass.     OVARIES/ADNEXA:  Right ovary: 2.3 x 2.1 x 0.9 cm. 2.4 mL. Best seen transabdominally.  Ovarian Doppler flow is within normal limits.  No suspicious ovarian or adnexal abnormality.     Left ovary: Not visualized     OTHER:  No free fluid or loculated fluid collections.     IMPRESSION:     The endometrial stripe is slightly thicker measuring 8 mm and should be correlated with any continued postmenopausal bleeding. Hyperplastic change or other etiologies is not excluded. Endometrial biopsy could be considered if not performed recently.     Dominant 9.4 cm myometrial mass has mildly enlarged in size and by report measured 6.4 cm in 2020. This most likely represents a fibroid but nonspecific in appearance. If no intervention is performed, continued surveillance is advised in 3 to 6 months   time.    Today, the patient is doing well.  She denies significant abdominal pain, pelvic pain, nausea, vomiting, constipation, diarrhea, fevers, chills, patient now desires definitive management of endometrial hyperplasia.  Patient has a family history of primary peritoneal cancer in her mother.            Review of Systems   Constitutional: Negative.    HENT: Negative.     Eyes: Negative.    Respiratory: Negative.     Cardiovascular: Negative.    Gastrointestinal: Negative.    Endocrine: Negative.    Genitourinary: Negative.    Musculoskeletal: Negative.    Skin: Negative.    Neurological: Negative.    Hematological: Negative.    Psychiatric/Behavioral: Negative.         Current Outpatient Medications   Medication Sig Dispense Refill    atorvastatin (LIPITOR) 10 mg tablet TAKE ONE TABLET BY MOUTH EVERY MORNING 30 tablet 5    B Complex Vitamins (Vitamin B Complex) TABS Take 2 tablets by mouth      Barberry-Oreg Grape-Goldenseal 200-200-50 MG CAPS Take 500 mg by mouth      cholecalciferol (VITAMIN D3) 1,000 units tablet Take 10,000 Units by mouth daily      citalopram (CeleXA) 20 mg tablet TAKE ONE TABLET BY MOUTH EVERY MORNING 90 tablet 1    co-enzyme Q-10 30 MG capsule Take 100 mg by mouth 2 (two) times a day      Digestive Enzymes (SIMILASE PO) Take 1 capsule by mouth 2 (two) times a day      diltiazem (CARDIZEM CD) 180 mg 24 hr capsule TAKE TWO CAPSULES BY MOUTH DAILY 60 capsule 5    Magnesium 400 MG CAPS Take by mouth      Milk Thistle 500 MG CAPS Take by mouth      norethindrone (AYGESTIN) 5 mg tablet Take 3 tablets (15 mg total) by mouth daily 90 tablet 5    Omega-3 Fatty Acids (fish oil) 1,000 mg Take 1,000 mg by mouth daily      thyroid (ARMOUR) 90 MG tablet TAKE ONE TABLET BY MOUTH IN THE MORNING 30 tablet 1    Lactobacillus TABS Take 1 tablet by mouth (Patient not taking: Reported on 3/20/2024)       No current facility-administered medications for this visit.       Allergies   Allergen Reactions    Food Other (See Comments)     Wheat GI upset; joint inflammation    Other Other (See Comments)     ENVIRONMENTAL ALLERGENS. Pollen, dust, mold : sneezing  PLANTS. Sneezing  PET DANDER. Sneezing      Penicillins Other (See Comments)     Family hx of allergy to penicillin    Wheat Bran - Food Allergy  "Abdominal Pain    Horse-Derived Products Rash     Horse serum       Past Medical History:   Diagnosis Date    Atrial fibrillation (HCC) 10/2020    CPAP (continuous positive airway pressure) dependence     Disease of thyroid gland     Hypertension     Sleep apnea        Past Surgical History:   Procedure Laterality Date    BREAST BIOPSY Left     stereotactic bx - benign    KNEE SURGERY Left 2023    DC HYSTEROSCOPY BX ENDOMETRIUM&/POLYPC W/WO D&C N/A 2023    Procedure: (D&C) W/ HYSTEROSCOPY;  Surgeon: Barbara Parr MD;  Location: [facility];  Service: Gynecology       OB History          0    Para   0    Term   0       0    AB   0    Living   0         SAB   0    IAB   0    Ectopic   0    Multiple   0    Live Births   0                 Family History   Problem Relation Age of Onset    Cancer Mother     Esophageal cancer Father     Prostate cancer Father     No Known Problems Sister     No Known Problems Sister     No Known Problems Maternal Grandmother     Prostate cancer Maternal Grandfather     No Known Problems Paternal Grandmother     No Known Problems Paternal Grandfather     Stomach cancer Maternal Aunt     Leukemia Maternal Aunt     Breast cancer Maternal Aunt     Breast cancer Paternal Aunt     No Known Problems Paternal Aunt        The following portions of the patient's history were reviewed and updated as appropriate: allergies, current medications, past family history, past medical history, past social history, past surgical history, and problem list.      Objective:    Blood pressure 140/70, pulse 74, temperature 98.5 °F (36.9 °C), resp. rate 18, height 5' 6\" (1.676 m), weight 109 kg (241 lb), SpO2 98%.  Body mass index is 38.9 kg/m².    Physical Exam  Constitutional:       Appearance: She is well-developed.   HENT:      Head: Normocephalic and atraumatic.   Eyes:      Pupils: Pupils are equal, round, and reactive to light.   Cardiovascular:      Rate and Rhythm: Normal rate " "and regular rhythm.      Heart sounds: Normal heart sounds.   Pulmonary:      Effort: Pulmonary effort is normal. No respiratory distress.      Breath sounds: Normal breath sounds.   Abdominal:      General: Bowel sounds are normal. There is no distension.      Palpations: Abdomen is soft. Abdomen is not rigid.      Tenderness: There is no abdominal tenderness. There is no guarding or rebound.   Genitourinary:     Comments: -Normal external female genitalia, normal Bartholin's and [place]'s glands                  -Normal midline urethral meatus. No lesions notes                  -Bladder without fullness mass or tenderness                  -Vagina without lesion or discharge No significant cystocele or rectocele noted                  -Cervix normal appearing without visible lesions                  -Uterus with normal contour, mobility.  Lower uterine segment fibroid noted.  No tenderness,                  -Adnexae without  mass or tenderness                  - Anus without fissure of lesion    Musculoskeletal:         General: Normal range of motion.      Cervical back: Normal range of motion and neck supple.   Lymphadenopathy:      Cervical: No cervical adenopathy.      Upper Body:      Right upper body: No supraclavicular adenopathy.      Left upper body: No supraclavicular adenopathy.   Skin:     General: Skin is warm and dry.   Neurological:      Mental Status: She is alert and oriented to person, place, and time.   Psychiatric:         Behavior: Behavior normal.           No results found for: \"\"  Lab Results   Component Value Date    WBC 4.83 08/22/2023    HGB 13.8 08/22/2023    HCT 42.7 08/22/2023    MCV 84 08/22/2023     08/22/2023     Lab Results   Component Value Date    K 4.8 08/22/2023     08/22/2023    CO2 30 08/22/2023    BUN 20 08/22/2023    CREATININE 0.75 08/22/2023    GLUF 92 08/22/2023    CALCIUM 9.5 08/22/2023    EGFR 87 08/22/2023        Trend:  No results found for: " "\"\"     "

## 2024-07-01 NOTE — ASSESSMENT & PLAN NOTE
The patient has a new diagnosis of complex endometrial hyperplasia with atypia by her most recent biopsy.  We have discussed with the patient that there may be as much as 5% likelihood of having a concurrent endometrial cancer.  We have discussed treatment options for this disease including surgery and hormonal management.  I have stated that medical evidence indicates that at this point to surgical management is her best option. I have discussed also the risks and benefits of lymph node biopsy at the time of surgery.  We have discussed open versus laparoscopic versus robotic approach and have recommended the following:    Robotically assisted total laparoscopic hysterectomy bilateral salpingo-oophorectomy with possible pelvic and periaortic lymph node biopsy.  Have discussed risks and benefits of the procedure including bleeding requiring transfusion infection, infection, damage to local structures including bowel bladder ureter and other local organs.  We discussed the risk of deep venous thrombosis.  All of these complications are in the 2-4% range of likelihood.  An open procedure may be required.  The patient understands the risks and benefits of the procedure and has signed an informed consent.  I personally signed the consent form with her.  She does understand that further treatment including chemotherapy radiation therapy or hormones may be required based on the final postoperative pathologic diagnosis and staging.  Standard preoperative testing including type and screen is CBC CPM P chest x-ray and EKG will be ordered.  Any appropriate consultations for preoperative evaluation will also be ordered.  Overall consultation took 60 min with greater than 50% in dedicated toward discussion time.

## 2024-07-15 DIAGNOSIS — F41.9 ANXIETY: ICD-10-CM

## 2024-07-15 DIAGNOSIS — E03.9 ACQUIRED HYPOTHYROIDISM: ICD-10-CM

## 2024-07-16 RX ORDER — CITALOPRAM 20 MG/1
20 TABLET ORAL DAILY
Qty: 90 TABLET | Refills: 0 | Status: SHIPPED | OUTPATIENT
Start: 2024-07-16

## 2024-07-16 RX ORDER — THYROID 90 MG/1
90 TABLET ORAL EVERY MORNING
Qty: 30 TABLET | Refills: 0 | Status: SHIPPED | OUTPATIENT
Start: 2024-07-16

## 2024-08-09 ENCOUNTER — OFFICE VISIT (OUTPATIENT)
Dept: LAB | Facility: HOSPITAL | Age: 59
End: 2024-08-09
Payer: COMMERCIAL

## 2024-08-09 ENCOUNTER — HOSPITAL ENCOUNTER (OUTPATIENT)
Dept: RADIOLOGY | Facility: HOSPITAL | Age: 59
End: 2024-08-09
Payer: COMMERCIAL

## 2024-08-09 ENCOUNTER — APPOINTMENT (OUTPATIENT)
Dept: LAB | Facility: HOSPITAL | Age: 59
End: 2024-08-09
Payer: COMMERCIAL

## 2024-08-09 DIAGNOSIS — D25.1 INTRAMURAL LEIOMYOMA OF UTERUS: ICD-10-CM

## 2024-08-09 DIAGNOSIS — N85.00 ENDOMETRIAL HYPERPLASIA WITHOUT ATYPIA: ICD-10-CM

## 2024-08-09 LAB
ALBUMIN SERPL BCG-MCNC: 4.1 G/DL (ref 3.5–5)
ALP SERPL-CCNC: 39 U/L (ref 34–104)
ALT SERPL W P-5'-P-CCNC: 20 U/L (ref 7–52)
ANION GAP SERPL CALCULATED.3IONS-SCNC: 7 MMOL/L (ref 4–13)
AST SERPL W P-5'-P-CCNC: 16 U/L (ref 13–39)
BASOPHILS # BLD AUTO: 0.04 THOUSANDS/ÂΜL (ref 0–0.1)
BASOPHILS NFR BLD AUTO: 1 % (ref 0–1)
BILIRUB SERPL-MCNC: 0.45 MG/DL (ref 0.2–1)
BUN SERPL-MCNC: 15 MG/DL (ref 5–25)
CALCIUM SERPL-MCNC: 9.2 MG/DL (ref 8.4–10.2)
CHLORIDE SERPL-SCNC: 108 MMOL/L (ref 96–108)
CO2 SERPL-SCNC: 23 MMOL/L (ref 21–32)
CREAT SERPL-MCNC: 0.77 MG/DL (ref 0.6–1.3)
EOSINOPHIL # BLD AUTO: 0.22 THOUSAND/ÂΜL (ref 0–0.61)
EOSINOPHIL NFR BLD AUTO: 4 % (ref 0–6)
ERYTHROCYTE [DISTWIDTH] IN BLOOD BY AUTOMATED COUNT: 13.7 % (ref 11.6–15.1)
GFR SERPL CREATININE-BSD FRML MDRD: 84 ML/MIN/1.73SQ M
GLUCOSE SERPL-MCNC: 99 MG/DL (ref 65–140)
HCT VFR BLD AUTO: 44.2 % (ref 34.8–46.1)
HGB BLD-MCNC: 14.8 G/DL (ref 11.5–15.4)
IMM GRANULOCYTES # BLD AUTO: 0.02 THOUSAND/UL (ref 0–0.2)
IMM GRANULOCYTES NFR BLD AUTO: 0 % (ref 0–2)
LYMPHOCYTES # BLD AUTO: 2.07 THOUSANDS/ÂΜL (ref 0.6–4.47)
LYMPHOCYTES NFR BLD AUTO: 34 % (ref 14–44)
MCH RBC QN AUTO: 28.8 PG (ref 26.8–34.3)
MCHC RBC AUTO-ENTMCNC: 33.5 G/DL (ref 31.4–37.4)
MCV RBC AUTO: 86 FL (ref 82–98)
MONOCYTES # BLD AUTO: 0.54 THOUSAND/ÂΜL (ref 0.17–1.22)
MONOCYTES NFR BLD AUTO: 9 % (ref 4–12)
NEUTROPHILS # BLD AUTO: 3.12 THOUSANDS/ÂΜL (ref 1.85–7.62)
NEUTS SEG NFR BLD AUTO: 52 % (ref 43–75)
NRBC BLD AUTO-RTO: 0 /100 WBCS
PLATELET # BLD AUTO: 336 THOUSANDS/UL (ref 149–390)
PMV BLD AUTO: 9.3 FL (ref 8.9–12.7)
POTASSIUM SERPL-SCNC: 4 MMOL/L (ref 3.5–5.3)
PROT SERPL-MCNC: 6.7 G/DL (ref 6.4–8.4)
RBC # BLD AUTO: 5.14 MILLION/UL (ref 3.81–5.12)
SODIUM SERPL-SCNC: 138 MMOL/L (ref 135–147)
TSH SERPL DL<=0.05 MIU/L-ACNC: 1.32 UIU/ML (ref 0.45–4.5)
WBC # BLD AUTO: 6.01 THOUSAND/UL (ref 4.31–10.16)

## 2024-08-09 PROCEDURE — 86901 BLOOD TYPING SEROLOGIC RH(D): CPT | Performed by: OBSTETRICS & GYNECOLOGY

## 2024-08-09 PROCEDURE — 71046 X-RAY EXAM CHEST 2 VIEWS: CPT

## 2024-08-09 PROCEDURE — 86850 RBC ANTIBODY SCREEN: CPT | Performed by: OBSTETRICS & GYNECOLOGY

## 2024-08-09 PROCEDURE — 93005 ELECTROCARDIOGRAM TRACING: CPT

## 2024-08-09 PROCEDURE — 86900 BLOOD TYPING SEROLOGIC ABO: CPT | Performed by: OBSTETRICS & GYNECOLOGY

## 2024-08-10 ENCOUNTER — LAB REQUISITION (OUTPATIENT)
Dept: LAB | Facility: HOSPITAL | Age: 59
End: 2024-08-10
Payer: COMMERCIAL

## 2024-08-10 DIAGNOSIS — N85.00 ENDOMETRIAL HYPERPLASIA, UNSPECIFIED: ICD-10-CM

## 2024-08-10 DIAGNOSIS — D25.1 INTRAMURAL LEIOMYOMA OF UTERUS: ICD-10-CM

## 2024-08-10 DIAGNOSIS — E03.9 ACQUIRED HYPOTHYROIDISM: ICD-10-CM

## 2024-08-11 LAB
ATRIAL RATE: 62 BPM
P AXIS: 26 DEGREES
PR INTERVAL: 148 MS
QRS AXIS: -52 DEGREES
QRSD INTERVAL: 106 MS
QT INTERVAL: 408 MS
QTC INTERVAL: 414 MS
T WAVE AXIS: 65 DEGREES
VENTRICULAR RATE: 62 BPM

## 2024-08-11 PROCEDURE — 93010 ELECTROCARDIOGRAM REPORT: CPT | Performed by: INTERNAL MEDICINE

## 2024-08-11 RX ORDER — THYROID,PORK 90 MG
90 TABLET ORAL EVERY MORNING
Qty: 30 TABLET | Refills: 5 | Status: SHIPPED | OUTPATIENT
Start: 2024-08-11

## 2024-08-29 NOTE — PRE-PROCEDURE INSTRUCTIONS
Pre-Surgery Instructions:   Medication Instructions    atorvastatin (LIPITOR) 10 mg tablet Take night before surgery    B Complex Vitamins (Vitamin B Complex) TABS Stop taking 7 days prior to surgery.    Barberry-Oreg Grape-Goldenseal 200-200-50 MG CAPS Stop taking 7 days prior to surgery.    Cholecalciferol (Vitamin D3) 250 MCG (98221 UT) TABS Stop taking 7 days prior to surgery.    citalopram (CeleXA) 20 mg tablet Take night before surgery    Coenzyme Q10 100 MG TABS Stop taking 7 days prior to surgery.    Digestive Enzymes (SIMILASE PO) Stop taking 7 days prior to surgery.    diltiazem (CARDIZEM CD) 180 mg 24 hr capsule Take day of surgery.    Magnesium 400 MG CAPS Stop taking 7 days prior to surgery.    Milk Thistle 500 MG CAPS Stop taking 7 days prior to surgery.    norethindrone (AYGESTIN) 5 mg tablet Take day of surgery.    Omega-3 Fatty Acids (fish oil) 1,000 mg Stop taking 7 days prior to surgery.    thyroid ([place] Thyroid) 90 MG tablet Take day of surgery.    Medication instructions for day surgery reviewed. Please use only a sip of water to take your instructed medications. Avoid all over the counter vitamins, supplements and NSAIDS for one week prior to surgery per anesthesia guidelines. Tylenol is ok to take as needed.     You will receive a call one business day prior to surgery with an arrival time and hospital directions. If your surgery is scheduled on a Monday, the hospital will be calling you on the Friday prior to your surgery. If you have not heard from anyone by 8pm, please call the hospital supervisor through the hospital  at [phone]. ([place] [phone] or [place] [phone]).    Do not eat or drink anything after midnight the night before your surgery, including candy, mints, lifesavers, or chewing gum. Do not drink alcohol 24hrs before your surgery. Try not to smoke at least 24hrs before your surgery.       Follow the pre surgery showering instructions as listed in the  “My Surgical Experience Booklet” or otherwise provided by your surgeon's office. Do not use a blade to shave the surgical area 1 week before surgery. It is okay to use a clean electric clippers up to 24 hours before surgery. Do not apply any lotions, creams, including makeup, cologne, deodorant, or perfumes after showering on the day of your surgery. Do not use dry shampoo, hair spray, hair gel, or any type of hair products.     No contact lenses, eye make-up, or artificial eyelashes. Remove nail polish, including gel polish, and any artificial, gel, or acrylic nails if possible. Remove all jewelry including rings and body piercing jewelry.     Wear causal clothing that is easy to take on and off. Consider your type of surgery.    Keep any valuables, jewelry, piercings at home. Please bring any specially ordered equipment (sling, braces) if indicated.    Arrange for a responsible person to drive you to and from the hospital on the day of your surgery. Please confirm the visitor policy for the day of your procedure when you receive your phone call with an arrival time.     Call the surgeon's office with any new illnesses, exposures, or additional questions prior to surgery.    Please reference your “My Surgical Experience Booklet” for additional information to prepare for your upcoming surgery.     Confirmed patient received 3 pre-surgical drinks with instructions.

## 2024-09-05 ENCOUNTER — ANESTHESIA EVENT (OUTPATIENT)
Dept: PERIOP | Facility: HOSPITAL | Age: 59
End: 2024-09-05
Payer: COMMERCIAL

## 2024-09-05 ENCOUNTER — TELEPHONE (OUTPATIENT)
Dept: GYNECOLOGIC ONCOLOGY | Facility: CLINIC | Age: 59
End: 2024-09-05

## 2024-09-05 DIAGNOSIS — I10 PRIMARY HYPERTENSION: ICD-10-CM

## 2024-09-05 NOTE — DISCHARGE INSTRUCTIONS
[facility] - Gynecologic Oncology  Vincenzo Reis, Joey Brewster  [phone]    Hysterectomy Discharge Instructions    WHAT YOU NEED TO KNOW:   A hysterectomy is surgery to remove your uterus. Your ovaries, fallopian tubes, cervix, or part of your vagina may also need to be removed. The organs and tissue that will be removed depends on your medical condition.  After a hysterectomy, you will not be able to become pregnant.  If your ovaries are removed, you will go through menopause if you have not already.    DISCHARGE INSTRUCTIONS:   Contact your doctor at the number above if:   You have a fever over 101o.  You have nausea or are vomiting that does not improve after a light meal.   Your pain is getting worse, even after you take medicine.   You feel pain or burning when you urinate, or you have trouble urinating.   You have pus or a foul-smelling odor coming from your vagina.    Your wound is red, swollen, or draining pus.  You see new or an increased amount of bright red blood coming from your vagina or your incisions.   You have questions or concerns about your condition or care.    Seek care immediately:   Your arm or leg feels warm, tender, and painful. It may look swollen and red.  You have increasing abdominal or pelvic pain.   You have heavy vaginal bleeding that fills 1 or more sanitary pads in 1 hour.    Call 911 for any of the following:   You feel lightheaded, short of breath, and have chest pain.   You cough up blood.    Medicines: You may need any of the following:  Prescription medicine may be given. You may receive a prescription for pain medication or be advised to use over the counter (OTC) pain medication such as acetaminophen (Tylenol) or ibuprofen (Advil, Motrin). Ask your healthcare provider how to take this medicine safely.  NSAIDs , such as ibuprofen, help decrease swelling, pain, and fever. NSAIDs can cause stomach bleeding or kidney problems in certain  people. If you take blood thinner medicine, always ask your healthcare provider if NSAIDs are safe for you. Always read the medicine label and follow directions.   Stool softeners help treat or prevent constipation.    Take your medicine as directed. Contact your healthcare provider if you think your medicine is not helping or if you have side effects. Tell him or her if you are allergic to any medicine. Keep a list of the medicines, vitamins, and herbs you take. Include the amounts, and when and why you take them. Bring the list or the pill bottles to follow-up visits. Carry your medicine list with you in case of an emergency.    Activity:   Rest as needed. Get up and move around as directed to help prevent blood clots. Start with short walks and slowly increase the distance every day. Limit the number of times you climb stairs to 2 times each day for the first week. Plan most of your daily activities on one level of your home.      Do not lift objects heavier than 10 pounds for 6 weeks. Avoid strenuous activity for 2 weeks.      Do not strain during bowel movements. High-fiber foods and extra liquids can help you prevent constipation. Examples of high-fiber foods are fruit and bran. Prune juice and water are good liquids to drink.      Do not have sex, use tampons, or douche for up to 8 weeks.     You may shower as soon as the day after surgery.  Tub baths can be taken starting 2 weeks after surgery.Do not go into pools or hot tubs until cleared by your doctor.      Ask when it is safe for you to drive. It is generally safe to drive after 2 weeks and when no longer taking prescription pain medication.    Ask when you may return to work and to other regular activities.    Wound care: Care for your abdominal incisions as directed. Carefully wash around the wound with soap and water. If you have Hibiclens or medicated soap that you were instructed to use before surgery, you may use that to wash with for up to 2 days  after surgery.  If not, any mild non-scented, non-abrasive soap is safe.  It is okay to let the soap and water run over your incision. Do not scrub your incision. Dry the area and put on new, clean bandages as directed. Change your bandages when they get wet or dirty. If you have strips of medical tape, let them fall off on their own. It may take 7 to 14 days for them to fall off. Check your incision every day for redness, swelling, or pus.   Deep breathing: Take deep breaths and cough 10 times each hour. This will decrease your risk for a lung infection. Take a deep breath and hold it for as long as you can. Let the air out and then cough strongly. Deep breaths help open your airway. You may be given an incentive spirometer to help you take deep breaths. Put the plastic piece in your mouth and take a slow, deep breath, then let the air out and cough. Repeat these steps 10 times every hour.   Get support: This surgery may be life-changing for you and your family. You will no longer be able to get pregnant. Sudden changes in the levels of your hormones may occur and cause mood swings and depression. You may feel angry, sad, or frightened, or cry frequently and unexpectedly. These feelings are normal. Talk to your healthcare provider about where you can get support. You can also ask if hormone replacement medicine is right for you.   Follow up with your healthcare provider or gynecologist as directed: You may need to return to have stitches removed, and for other tests. Write down your questions so you remember to ask them during your visits.      © 2017 [employer] Information is for End User's use only and may not be sold, redistributed or otherwise used for commercial purposes. All illustrations and images included in CareNotes® are the copyrighted property of A.D.A.M., Inc. or [employer].  The above information is an  only. It is not intended as medical advice for  individual conditions or treatments. Talk to your doctor, nurse or pharmacist before following any medical regimen to see if it is safe and effective for you.

## 2024-09-05 NOTE — H&P
For questions/concerns on this patient, please reach out to the following:  [facility]- GynOnc Resident     H&P Exam - Gynecology Oncology  Lenka Wayne 59 y.o. female MRN: [id number]  Unit/Bed#: OR [place] Encounter: [id number]    Assessment & Plan   * Endometrial hyperplasia without atypia  Assessment & Plan  59 w/ PMB w/ EMB showing endometrial hyperplasia w/o atypia    Proceed with surgery as scheduled: RATLH, BSO, possible SLN biopsy  NPO  Plan for same-day discharge  Plan for outpatient follow up approximately 2 weeks post-op       HPI:  Lenka Wayne is a 59 y.o. female who presents for scheduled surgery.  She underwent EMB for PMB which showed endometrial hyperplasia w/o aytpia.  She is scheduled for outpatient RATLH, BSO, possible SLN biopsy.  Today, she feels well and reports no major changes in her health since she was last seen in the office.    Oncology History    No history exists.       Review of Systems   Constitutional:  Negative for chills and fever.   Eyes:  Negative for visual disturbance.   Respiratory:  Negative for cough and shortness of breath.    Cardiovascular:  Negative for chest pain and leg swelling.   Gastrointestinal:  Negative for abdominal pain, diarrhea, nausea and vomiting.   Genitourinary:  Positive for vaginal bleeding. Negative for dysuria, frequency, hematuria, pelvic pain, urgency and vaginal discharge.   Neurological:  Negative for dizziness, light-headedness and headaches.       Historical Information   Past Medical History:   Diagnosis Date    Arthritis     Atrial fibrillation (HCC) 10/2020    CPAP (continuous positive airway pressure) dependence     Disease of thyroid gland     Environmental allergies     Fibroids     uterine   LTH  BSO  today   9/6/2024    Hyperlipidemia     Hypertension     Sleep apnea      Past Surgical History:   Procedure Laterality Date    BREAST BIOPSY Left 2012    stereotactic bx - benign    COLONOSCOPY      JOINT REPLACEMENT      KNEE SURGERY  Left 2023    [place] HYSTEROSCOPY BX ENDOMETRIUM&/POLYPC W/WO D&C N/A 2023    Procedure: (D&C) W/ HYSTEROSCOPY;  Surgeon: Barbara Parr MD;  Location: [facility];  Service: Gynecology     OB History    Para Term  AB Living   0 0 0 0 0 0   SAB IAB Ectopic Multiple Live Births   0 0 0 0 0     Family History   Problem Relation Age of Onset    Cancer Mother     Esophageal cancer Father     Prostate cancer Father     No Known Problems Sister     No Known Problems Sister     No Known Problems Maternal Grandmother     Prostate cancer Maternal Grandfather     No Known Problems Paternal Grandmother     No Known Problems Paternal Grandfather     Stomach cancer Maternal Aunt     Leukemia Maternal Aunt     Breast cancer Maternal Aunt     Breast cancer Paternal Aunt     No Known Problems Paternal Aunt      Social History   Social History     Substance and Sexual Activity   Alcohol Use Not Currently     Social History     Substance and Sexual Activity   Drug Use Never    Comment: Recovering alcoholic          Social History     Tobacco Use   Smoking Status Never    Passive exposure: Never   Smokeless Tobacco Never       Meds/Allergies     Medications Prior to Admission:     atorvastatin (LIPITOR) 10 mg tablet    B Complex Vitamins (Vitamin B Complex) TABS    Barberry-Oreg Grape-Goldenseal 200-200-50 MG CAPS    Cholecalciferol (Vitamin D3) 250 MCG (56139 UT) TABS    citalopram (CeleXA) 20 mg tablet    Coenzyme Q10 100 MG TABS    Digestive Enzymes (SIMILASE PO)    diltiazem (CARDIZEM CD) 180 mg 24 hr capsule    Magnesium 400 MG CAPS    Milk Thistle 500 MG CAPS    norethindrone (AYGESTIN) 5 mg tablet    Omega-3 Fatty Acids (fish oil) 1,000 mg    thyroid ([place] Thyroid) 90 MG tablet    Lactobacillus TABS  Allergies   Allergen Reactions    Food Other (See Comments)     Wheat GI upset; joint inflammation    Other Other (See Comments)     ENVIRONMENTAL ALLERGENS. Pollen, dust, mold : sneezing  PLANTS.  "Sneezing  PET DANDER. Sneezing      Penicillins Other (See Comments)     Family hx of allergy to penicillin    Wheat Bran - Food Allergy Abdominal Pain    Horse-Derived Products Rash     Horse serum       Objective     Vitals:  /78   Pulse 78   Temp 97.7 °F (36.5 °C) (Temporal)   Resp 18   Ht 5' 6\" (1.676 m)   Wt 107 kg (236 lb 1.8 oz)   SpO2 95%   BMI 38.11 kg/m²       Lab Results   Component Value Date    WBC 6.01 08/09/2024    HGB 14.8 08/09/2024    HCT 44.2 08/09/2024    MCV 86 08/09/2024     08/09/2024       Lab Results   Component Value Date    CALCIUM 9.2 08/09/2024    K 4.0 08/09/2024    CO2 23 08/09/2024     08/09/2024    BUN 15 08/09/2024    CREATININE 0.77 08/09/2024       Physical Exam  Constitutional:       General: She is not in acute distress.     Appearance: Normal appearance. She is not ill-appearing.   HENT:      Mouth/Throat:      Mouth: Mucous membranes are moist.   Eyes:      Extraocular Movements: Extraocular movements intact.   Cardiovascular:      Rate and Rhythm: Normal rate and regular rhythm.      Heart sounds: Normal heart sounds.   Pulmonary:      Effort: Pulmonary effort is normal.      Breath sounds: Normal breath sounds.   Abdominal:      General: There is no distension.      Palpations: Abdomen is soft.      Tenderness: There is no abdominal tenderness. There is no guarding.   Musculoskeletal:         General: No swelling or tenderness.      Right lower leg: No edema.      Left lower leg: No edema.   Skin:     General: Skin is warm and dry.      Coloration: Skin is not jaundiced or pale.   Neurological:      General: No focal deficit present.      Mental Status: She is alert.   Psychiatric:         Mood and Affect: Mood normal.         Behavior: Behavior normal.         Thought Content: Thought content normal.         Judgment: Judgment normal.       Imaging: I have personally reviewed pertinent reports.      EKG, Pathology, and Other Studies: I have " personally reviewed pertinent reports.      Code Status: level 1    Beth Cleary MD  9/6/2024  6:56 AM

## 2024-09-05 NOTE — ANESTHESIA PREPROCEDURE EVALUATION
Procedure:  ROBOTIC ASSISTED TOTAL LAPAROSCOPIC HYSTERECTOMY, BSO, EUA (Abdomen)    Relevant Problems   ANESTHESIA (within normal limits)      CARDIO   (+) Atrial fibrillation (HCC)   (+) Mixed hyperlipidemia   (+) Primary hypertension      ENDO   (+) Acquired hypothyroidism      GI/HEPATIC (within normal limits)      /RENAL (within normal limits)      GYN (within normal limits)      HEMATOLOGY (within normal limits)      MUSCULOSKELETAL (within normal limits)      NEURO/PSYCH   (+) Anxiety   (+) Dysthymic disorder   (+) Mild episode of recurrent major depressive disorder (HCC)      PULMONARY   (+) Obstructive sleep apnea syndrome        Physical Exam    Airway    Mallampati score: II         Dental   No notable dental hx     Cardiovascular  Rhythm: regular, Rate: normal, Cardiovascular exam normal    Pulmonary  Pulmonary exam normal Breath sounds clear to auscultation    Other Findings  post-pubertal.      Anesthesia Plan  ASA Score- 2     Anesthesia Type- general with ASA Monitors.         Additional Monitors:     Airway Plan: ETT.    Comment: Patient verbally consented for TAP block in holding prn open..       Plan Factors-Exercise tolerance (METS): >4 METS.    Chart reviewed. EKG reviewed.  Existing labs reviewed. Patient summary reviewed.    Patient is not a current smoker.              Induction- intravenous.    Postoperative Plan- Plan for postoperative opioid use.         Informed Consent- Anesthetic plan and risks discussed with patient.

## 2024-09-05 NOTE — ASSESSMENT & PLAN NOTE
59 w/ PMB w/ EMB showing endometrial hyperplasia w/o atypia    Proceed with surgery as scheduled: RATLH, BSO, possible SLN biopsy  NPO  Plan for same-day discharge  Plan for outpatient follow up approximately 2 weeks post-op

## 2024-09-06 ENCOUNTER — HOSPITAL ENCOUNTER (OUTPATIENT)
Facility: HOSPITAL | Age: 59
Setting detail: OUTPATIENT SURGERY
Discharge: HOME | End: 2024-09-06
Attending: OBSTETRICS & GYNECOLOGY | Admitting: OBSTETRICS & GYNECOLOGY
Payer: COMMERCIAL

## 2024-09-06 ENCOUNTER — ANESTHESIA (OUTPATIENT)
Dept: PERIOP | Facility: HOSPITAL | Age: 59
End: 2024-09-06
Payer: COMMERCIAL

## 2024-09-06 VITALS
DIASTOLIC BLOOD PRESSURE: 71 MMHG | BODY MASS INDEX: 37.95 KG/M2 | OXYGEN SATURATION: 96 % | HEIGHT: 66 IN | HEART RATE: 69 BPM | SYSTOLIC BLOOD PRESSURE: 124 MMHG | TEMPERATURE: 98.6 F | WEIGHT: 236.11 LBS | RESPIRATION RATE: 16 BRPM

## 2024-09-06 DIAGNOSIS — N85.00 ENDOMETRIAL HYPERPLASIA WITHOUT ATYPIA: ICD-10-CM

## 2024-09-06 DIAGNOSIS — G89.18 POSTOPERATIVE PAIN: ICD-10-CM

## 2024-09-06 DIAGNOSIS — D25.1 INTRAMURAL LEIOMYOMA OF UTERUS: ICD-10-CM

## 2024-09-06 DIAGNOSIS — G89.18 POST-OPERATIVE PAIN: Primary | ICD-10-CM

## 2024-09-06 PROCEDURE — 86850 RBC ANTIBODY SCREEN: CPT | Performed by: OBSTETRICS & GYNECOLOGY

## 2024-09-06 PROCEDURE — 58573 TLH W/T/O UTERUS OVER 250 G: CPT | Performed by: OBSTETRICS & GYNECOLOGY

## 2024-09-06 PROCEDURE — 88309 TISSUE EXAM BY PATHOLOGIST: CPT | Performed by: PATHOLOGY

## 2024-09-06 PROCEDURE — S2900 ROBOTIC SURGICAL SYSTEM: HCPCS | Performed by: OBSTETRICS & GYNECOLOGY

## 2024-09-06 PROCEDURE — NC001 PR NO CHARGE: Performed by: OBSTETRICS & GYNECOLOGY

## 2024-09-06 PROCEDURE — 88305 TISSUE EXAM BY PATHOLOGIST: CPT | Performed by: PATHOLOGY

## 2024-09-06 PROCEDURE — 86901 BLOOD TYPING SEROLOGIC RH(D): CPT | Performed by: OBSTETRICS & GYNECOLOGY

## 2024-09-06 PROCEDURE — 88112 CYTOPATH CELL ENHANCE TECH: CPT | Performed by: PATHOLOGY

## 2024-09-06 PROCEDURE — 86900 BLOOD TYPING SEROLOGIC ABO: CPT | Performed by: OBSTETRICS & GYNECOLOGY

## 2024-09-06 RX ORDER — OXYCODONE HYDROCHLORIDE 5 MG/1
TABLET ORAL
Qty: 7 TABLET | Refills: 0 | Status: SHIPPED | OUTPATIENT
Start: 2024-09-06

## 2024-09-06 RX ORDER — HYDROMORPHONE HCL/PF 1 MG/ML
0.5 SYRINGE (ML) INJECTION
Status: DISCONTINUED | OUTPATIENT
Start: 2024-09-06 | End: 2024-09-06 | Stop reason: HOSPADM

## 2024-09-06 RX ORDER — LIDOCAINE HYDROCHLORIDE 20 MG/ML
INJECTION, SOLUTION EPIDURAL; INFILTRATION; INTRACAUDAL; PERINEURAL AS NEEDED
Status: DISCONTINUED | OUTPATIENT
Start: 2024-09-06 | End: 2024-09-06

## 2024-09-06 RX ORDER — MIDAZOLAM HYDROCHLORIDE 2 MG/2ML
INJECTION, SOLUTION INTRAMUSCULAR; INTRAVENOUS AS NEEDED
Status: DISCONTINUED | OUTPATIENT
Start: 2024-09-06 | End: 2024-09-06

## 2024-09-06 RX ORDER — CEFAZOLIN SODIUM 2 G/50ML
2000 SOLUTION INTRAVENOUS ONCE
Status: COMPLETED | OUTPATIENT
Start: 2024-09-06 | End: 2024-09-06

## 2024-09-06 RX ORDER — METRONIDAZOLE 500 MG/100ML
500 INJECTION, SOLUTION INTRAVENOUS ONCE
Status: COMPLETED | OUTPATIENT
Start: 2024-09-06 | End: 2024-09-06

## 2024-09-06 RX ORDER — ROCURONIUM BROMIDE 10 MG/ML
INJECTION, SOLUTION INTRAVENOUS AS NEEDED
Status: DISCONTINUED | OUTPATIENT
Start: 2024-09-06 | End: 2024-09-06

## 2024-09-06 RX ORDER — ACETAMINOPHEN 325 MG/1
975 TABLET ORAL EVERY 6 HOURS PRN
Status: DISCONTINUED | OUTPATIENT
Start: 2024-09-06 | End: 2024-09-06 | Stop reason: HOSPADM

## 2024-09-06 RX ORDER — SODIUM CHLORIDE, SODIUM LACTATE, POTASSIUM CHLORIDE, CALCIUM CHLORIDE 600; 310; 30; 20 MG/100ML; MG/100ML; MG/100ML; MG/100ML
INJECTION, SOLUTION INTRAVENOUS CONTINUOUS PRN
Status: DISCONTINUED | OUTPATIENT
Start: 2024-09-06 | End: 2024-09-06

## 2024-09-06 RX ORDER — FENTANYL CITRATE 50 UG/ML
INJECTION, SOLUTION INTRAMUSCULAR; INTRAVENOUS AS NEEDED
Status: DISCONTINUED | OUTPATIENT
Start: 2024-09-06 | End: 2024-09-06

## 2024-09-06 RX ORDER — MAGNESIUM HYDROXIDE 1200 MG/15ML
LIQUID ORAL AS NEEDED
Status: DISCONTINUED | OUTPATIENT
Start: 2024-09-06 | End: 2024-09-06 | Stop reason: HOSPADM

## 2024-09-06 RX ORDER — OXYCODONE HYDROCHLORIDE 5 MG/1
5 TABLET ORAL EVERY 4 HOURS PRN
Status: DISCONTINUED | OUTPATIENT
Start: 2024-09-06 | End: 2024-09-06 | Stop reason: HOSPADM

## 2024-09-06 RX ORDER — OXYCODONE HYDROCHLORIDE 10 MG/1
10 TABLET ORAL EVERY 4 HOURS PRN
Status: DISCONTINUED | OUTPATIENT
Start: 2024-09-06 | End: 2024-09-06 | Stop reason: HOSPADM

## 2024-09-06 RX ORDER — DILTIAZEM HYDROCHLORIDE 180 MG/1
360 CAPSULE, COATED, EXTENDED RELEASE ORAL DAILY
Qty: 60 CAPSULE | Refills: 5 | Status: SHIPPED | OUTPATIENT
Start: 2024-09-06

## 2024-09-06 RX ORDER — BUPIVACAINE HYDROCHLORIDE 5 MG/ML
INJECTION, SOLUTION EPIDURAL; INTRACAUDAL AS NEEDED
Status: DISCONTINUED | OUTPATIENT
Start: 2024-09-06 | End: 2024-09-06 | Stop reason: HOSPADM

## 2024-09-06 RX ORDER — ONDANSETRON 2 MG/ML
INJECTION INTRAMUSCULAR; INTRAVENOUS AS NEEDED
Status: DISCONTINUED | OUTPATIENT
Start: 2024-09-06 | End: 2024-09-06

## 2024-09-06 RX ORDER — HYDROMORPHONE HCL/PF 1 MG/ML
SYRINGE (ML) INJECTION AS NEEDED
Status: DISCONTINUED | OUTPATIENT
Start: 2024-09-06 | End: 2024-09-06

## 2024-09-06 RX ORDER — ONDANSETRON 2 MG/ML
4 INJECTION INTRAMUSCULAR; INTRAVENOUS EVERY 6 HOURS PRN
Status: DISCONTINUED | OUTPATIENT
Start: 2024-09-06 | End: 2024-09-06 | Stop reason: HOSPADM

## 2024-09-06 RX ORDER — ENOXAPARIN SODIUM 100 MG/ML
40 INJECTION SUBCUTANEOUS
Status: COMPLETED | OUTPATIENT
Start: 2024-09-06 | End: 2024-09-06

## 2024-09-06 RX ORDER — ACETAMINOPHEN 500 MG
1000 TABLET ORAL EVERY 6 HOURS PRN
Start: 2024-09-06

## 2024-09-06 RX ORDER — ONDANSETRON 2 MG/ML
4 INJECTION INTRAMUSCULAR; INTRAVENOUS ONCE AS NEEDED
Status: DISCONTINUED | OUTPATIENT
Start: 2024-09-06 | End: 2024-09-06 | Stop reason: HOSPADM

## 2024-09-06 RX ORDER — PROPOFOL 10 MG/ML
INJECTION, EMULSION INTRAVENOUS AS NEEDED
Status: DISCONTINUED | OUTPATIENT
Start: 2024-09-06 | End: 2024-09-06

## 2024-09-06 RX ORDER — SODIUM CHLORIDE 9 MG/ML
125 INJECTION, SOLUTION INTRAVENOUS CONTINUOUS
Status: DISCONTINUED | OUTPATIENT
Start: 2024-09-06 | End: 2024-09-06

## 2024-09-06 RX ORDER — IBUPROFEN 600 MG/1
600 TABLET, FILM COATED ORAL EVERY 6 HOURS PRN
Status: DISCONTINUED | OUTPATIENT
Start: 2024-09-06 | End: 2024-09-06 | Stop reason: HOSPADM

## 2024-09-06 RX ORDER — IBUPROFEN 600 MG/1
600 TABLET, FILM COATED ORAL EVERY 6 HOURS PRN
Start: 2024-09-06

## 2024-09-06 RX ORDER — FENTANYL CITRATE/PF 50 MCG/ML
25 SYRINGE (ML) INJECTION
Status: DISCONTINUED | OUTPATIENT
Start: 2024-09-06 | End: 2024-09-06 | Stop reason: HOSPADM

## 2024-09-06 RX ADMIN — MIDAZOLAM 2 MG: 1 INJECTION INTRAMUSCULAR; INTRAVENOUS at 07:30

## 2024-09-06 RX ADMIN — SODIUM CHLORIDE 8 MCG: 9 INJECTION, SOLUTION INTRAVENOUS at 09:57

## 2024-09-06 RX ADMIN — SODIUM CHLORIDE, SODIUM LACTATE, POTASSIUM CHLORIDE, AND CALCIUM CHLORIDE: .6; .31; .03; .02 INJECTION, SOLUTION INTRAVENOUS at 07:44

## 2024-09-06 RX ADMIN — ROCURONIUM BROMIDE 10 MG: 10 INJECTION, SOLUTION INTRAVENOUS at 08:22

## 2024-09-06 RX ADMIN — PROPOFOL 200 MG: 10 INJECTION, EMULSION INTRAVENOUS at 07:38

## 2024-09-06 RX ADMIN — METRONIDAZOLE: 500 INJECTION, SOLUTION INTRAVENOUS at 07:51

## 2024-09-06 RX ADMIN — FENTANYL CITRATE 50 MCG: 50 INJECTION INTRAMUSCULAR; INTRAVENOUS at 07:38

## 2024-09-06 RX ADMIN — ROCURONIUM BROMIDE 20 MG: 10 INJECTION, SOLUTION INTRAVENOUS at 08:51

## 2024-09-06 RX ADMIN — LIDOCAINE HYDROCHLORIDE 100 MG: 20 INJECTION, SOLUTION EPIDURAL; INFILTRATION; INTRACAUDAL at 07:38

## 2024-09-06 RX ADMIN — ENOXAPARIN SODIUM 40 MG: 40 INJECTION SUBCUTANEOUS at 06:12

## 2024-09-06 RX ADMIN — FENTANYL CITRATE 50 MCG: 50 INJECTION INTRAMUSCULAR; INTRAVENOUS at 08:01

## 2024-09-06 RX ADMIN — OXYCODONE HYDROCHLORIDE 5 MG: 5 TABLET ORAL at 13:12

## 2024-09-06 RX ADMIN — ONDANSETRON 4 MG: 2 INJECTION INTRAMUSCULAR; INTRAVENOUS at 10:57

## 2024-09-06 RX ADMIN — CEFAZOLIN SODIUM 2000 MG: 2 SOLUTION INTRAVENOUS at 07:43

## 2024-09-06 RX ADMIN — ROCURONIUM BROMIDE 50 MG: 10 INJECTION, SOLUTION INTRAVENOUS at 07:38

## 2024-09-06 RX ADMIN — PROPOFOL 90 MCG/KG/MIN: 10 INJECTION, EMULSION INTRAVENOUS at 07:42

## 2024-09-06 RX ADMIN — SUGAMMADEX 200 MG: 100 INJECTION, SOLUTION INTRAVENOUS at 10:58

## 2024-09-06 RX ADMIN — SODIUM CHLORIDE 125 ML/HR: 0.9 INJECTION, SOLUTION INTRAVENOUS at 06:11

## 2024-09-06 RX ADMIN — ROCURONIUM BROMIDE 10 MG: 10 INJECTION, SOLUTION INTRAVENOUS at 09:39

## 2024-09-06 RX ADMIN — ROCURONIUM BROMIDE 10 MG: 10 INJECTION, SOLUTION INTRAVENOUS at 10:42

## 2024-09-06 RX ADMIN — SODIUM CHLORIDE, SODIUM LACTATE, POTASSIUM CHLORIDE, AND CALCIUM CHLORIDE: .6; .31; .03; .02 INJECTION, SOLUTION INTRAVENOUS at 08:44

## 2024-09-06 RX ADMIN — SODIUM CHLORIDE 8 MCG: 9 INJECTION, SOLUTION INTRAVENOUS at 09:06

## 2024-09-06 RX ADMIN — PHENYLEPHRINE HYDROCHLORIDE 30 MCG/MIN: 10 INJECTION INTRAVENOUS at 07:45

## 2024-09-06 RX ADMIN — HYDROMORPHONE HYDROCHLORIDE 0.5 MG: 1 INJECTION, SOLUTION INTRAMUSCULAR; INTRAVENOUS; SUBCUTANEOUS at 08:40

## 2024-09-06 NOTE — ANESTHESIA POSTPROCEDURE EVALUATION
Post-Op Assessment Note    CV Status:  Stable    Pain management: adequate       Mental Status:  Alert and awake   Hydration Status:  Euvolemic   PONV Controlled:  Controlled   Airway Patency:  Patent     Post Op Vitals Reviewed: Yes    No anethesia notable event occurred.    Staff: VAMSHI               /73 (09/06/24 1117)    Temp 98.3 °F (36.8 °C) (09/06/24 1117)    Pulse 77 (09/06/24 1117)   Resp 20 (09/06/24 1117)    SpO2 92 % (09/06/24 1117)

## 2024-09-06 NOTE — OP NOTE
OPERATIVE REPORT  PATIENT NAME: Lenka Wayne    :  1965  MRN: [id number]  Pt Location: [facility] OR ROOM 07    SURGERY DATE: 2024    Surgeons and Role:     * Kody Brewster MD - Primary     * Beth Cleary MD - Assisting    Preop Diagnosis:  Endometrial hyperplasia without atypia [N85.00]  Intramural leiomyoma of uterus [D25.1]    Post-Op Diagnosis Codes:     * Endometrial hyperplasia without atypia [N85.00]     * Intramural leiomyoma of uterus [D25.1]    Procedure(s):  ROBOTIC ASSISTED TOTAL LAPAROSCOPIC HYSTERECTOMY. BSO. EUA hysterectomy with great difficulty as uterus weighed 590 g    Specimen(s):  ID Type Source Tests Collected by Time Destination   1 :  Washing Pelvic Washing NON-GYNECOLOGIC CYTOLOGY Kody Brewster MD 2024 0835    2 : uterus, cervix, bilateral fallopian tubes, bilateral ovaries Tissue Uterus w/Bilateral Ovaries and Fallopian Tubes TISSUE EXAM Kody Brewster MD 2024 0938        Estimated Blood Loss:   Minimal    Drains:  [REMOVED] Urethral Catheter Non-latex;Double-lumen 16 Fr. (Removed)   Number of days: 0       Anesthesia Type:   General    Operative Indications:  Endometrial hyperplasia without atypia [N85.00]  Intramural leiomyoma of uterus [D25.1]      Operative Findings:  Upon entrance to the abdomen a 12-week size uterus was identified.  Normal tubes and ovaries were noted bilaterally.  The large fibroid was at the level of cervix and lower uterine segment.  There is no evidence of intra-abdominal disease.    Upon completion of surgical separation of uterus and attempted delivery through the vagina Jessica delivery without morcellation was not possible.  The uterus was placed with the in a large Endo Catch bag and morcellation occurred within the bag.  Nothing escaped into the abdomen or vagina.  Uterus weighed 590 g at completion of procedure.      Complications:   None    Procedure and Technique:    The patient was identified as herself and and appropriate  time-out procedure was performed.    The patient was placed in dorsal lithotomy position and prepped and draped in the usual sterile fashion including a 3 times vaginal chlorhexadine prep.  Attention was turned to the vagina where of Abdalla catheter was placed without difficulty.  An EEA sizer was then placed into the vagina without difficulty.    Attention was then turned to the abdomen where planned incision sites were marked and infiltrated with 0.5% Marcaine.  The periumbilical incision was created with a knife.  The Veress needle was placed without difficulty.  The abdomen was insufflated with sterile gas.  The remainder of the incisions were created with a knife.  The 8 mm trocar was placed into the jennifer umbilical incision and a camera was placed without difficulty.  Under direct visualization the 3 other DaVinci 8 mm ports were placed without difficulty.  A 5 mm assistant port was placed in the right lower quadrant without difficulty.  The patient was placed in steep Trendelenburg position.  The robot was brought in and side docked without difficulty.  Electrocautery sheers were placed in the number 1 arm, a Vessel Sealer was placed in the 3. Arm, and a Prograsp was placed in the 4. Arm.    I broke scrub an approached the console.  The right round ligament was taken down with the Vessel Sealer.  The right pelvic sidewall was opened with cautery indra.  The perivesical and pararectal spaces were open.  The infundibular pelvic ligament and ureter on the right were  in this retroperitoneal space.  The path of the ureter was identified.  The infundibular pelvic ligament was then taken down with the Vessel Sealer in multiple bites.  The posterior broad ligament was taken down with electrocautery indra.  The bladder flap was begun with the electrocautery Indra and taken down to the upper vagina.  The uterine artery pedicle was skeletonized and taken down with the Vessel Sealer.  The cardinal ligament was  taken down with sequential bites with the Vessel Sealer.  The uterus sacral ligament was taken down with the Vessel Sealer.     The same procedure was then performed on the patient's left-hand side again taking care to identify the ureter prior to taking down the infundibular pelvic ligament.  The vaginal cuff was then taken down with Electrocautery Indra.  The specimen was retrieved through the vagina with a double tooth tenaculum.  The uterus was placed inside a large Endo Catch bag prior to removing through the vagina.  The lower uterine segment fibroid required morcellation to remove the uterus through the vagina.  There was no leakage of any fluid within the abdomen or even within the vagina.  The vaginal cuff was closed with a running suture of 2 0 Stratafix.     The abdomen was explored and no further bleeding sites were noted.  The pneumoperitoneum was allowed to escape and no bleeding was noted.  All instruments were removed.  The trocars were removed.  The incision sites were closed with 4 0 Monocryl without difficulty.    The patient tolerated procedure without complications.  The sponge and instrument counts were correct prior to closure.  Hemostasis was assured prior to closure.  The patient was brought to the recovery room in stable condition.    Kody Brewster MD  Division of GYN Oncology  [facility].    I was present for the entire procedure.    Patient Disposition:  PACU              SIGNATURE: Kody Brewster MD  DATE: September 6, 2024  TIME: 10:57 AM

## 2024-09-06 NOTE — INTERVAL H&P NOTE
H&P reviewed. After examining the patient I find no changes in the patients condition since the H&P had been written.  Plan robotically assisted total laparoscopic hysterectomy bilateral salpingo-oophorectomy for endometrial hyperplasia without atypia.  Preoperative testing stable for surgery today.    Vitals:    09/06/24 0531   BP: 128/78   Pulse: 78   Resp: 18   Temp: 97.7 °F (36.5 °C)   SpO2: 95%

## 2024-09-11 PROCEDURE — 88112 CYTOPATH CELL ENHANCE TECH: CPT | Performed by: PATHOLOGY

## 2024-09-11 PROCEDURE — 88309 TISSUE EXAM BY PATHOLOGIST: CPT | Performed by: PATHOLOGY

## 2024-09-11 PROCEDURE — 88305 TISSUE EXAM BY PATHOLOGIST: CPT | Performed by: PATHOLOGY

## 2024-09-16 ENCOUNTER — RA CDI HCC (OUTPATIENT)
Dept: OTHER | Facility: HOSPITAL | Age: 59
End: 2024-09-16

## 2024-09-23 ENCOUNTER — OFFICE VISIT (OUTPATIENT)
Dept: GYNECOLOGIC ONCOLOGY | Facility: CLINIC | Age: 59
End: 2024-09-23

## 2024-09-23 VITALS
BODY MASS INDEX: 37.73 KG/M2 | HEIGHT: 66 IN | TEMPERATURE: 98 F | OXYGEN SATURATION: 97 % | DIASTOLIC BLOOD PRESSURE: 86 MMHG | SYSTOLIC BLOOD PRESSURE: 136 MMHG | WEIGHT: 234.8 LBS | RESPIRATION RATE: 16 BRPM | HEART RATE: 77 BPM

## 2024-09-23 DIAGNOSIS — D25.1 INTRAMURAL LEIOMYOMA OF UTERUS: Primary | ICD-10-CM

## 2024-09-23 PROCEDURE — 99024 POSTOP FOLLOW-UP VISIT: CPT | Performed by: OBSTETRICS & GYNECOLOGY

## 2024-09-23 NOTE — PROGRESS NOTES
Assessment/Plan:    Problem List Items Addressed This Visit       Intramural leiomyoma of uterus - Primary     Patient is a very pleasant 59-year-old female with a history of large fibroid uterus measuring approximately 590 g status post robot hysterectomy BSO.  She has no evidence of persistent hyperplasia.  She is healing well from surgery.  Routin instructions for liberalization of activities has been discussed.  Patient will follow-up on an as-needed basis.  She will continue to use over-the-counter stool softeners for her constipation.              CHIEF COMPLAINT: Postoperative evaluation      Patient ID: Lenka Wayne is a 59 y.o. female  Patient is very pleasant 59-year-old female with a history of endometrial hyperplasia without atypia.  She had been placed on progesterone.  She had several samplings which eventually failed to show any endometrial tissue.  As the hyperplasia was no longer evaluable a hysterectomy was performed.  Patient underwent robotic hysterectomy and BSO without complication.  Final pathology report reveals the following:  Final Diagnosis  A. Uterus, cervix, Bilateral Ovaries and Fallopian Tubes:  - Non-proliferative endometrium with stromal pseudo-decidualization consistent with progesterone effect.  - Intramural and separate detached leiomyomas.  - Benign bilateral fallopian tubes and ovaries.  - Benign cervix.    Today, the patient is doing well.  She denies significant abdominal pain, pelvic pain, nausea, vomiting,  diarrhea, fevers, chills, or vaginal bleeding.  Patient states her bladder function is better.  She does note some constipation which is responding to dietary changes.           The following portions of the patient's history were reviewed and updated as appropriate: allergies, current medications, past family history, past medical history, past social history, past surgical history, and problem list.    Review of Systems   Constitutional: Negative.    HENT:  Negative.     Eyes: Negative.    Respiratory: Negative.     Cardiovascular: Negative.    Gastrointestinal: Negative.    Endocrine: Negative.    Genitourinary: Negative.    Musculoskeletal: Negative.    Skin: Negative.    Neurological: Negative.    Hematological: Negative.    Psychiatric/Behavioral: Negative.         Current Outpatient Medications   Medication Sig Dispense Refill    atorvastatin (LIPITOR) 10 mg tablet TAKE ONE TABLET BY MOUTH EVERY MORNING (Patient taking differently: Take 10 mg by mouth daily Takes in PM) 30 tablet 5    B Complex Vitamins (Vitamin B Complex) TABS Take 2 tablets by mouth      Barberry-Oreg Grape-Goldenseal 200-200-50 MG CAPS Take 500 mg by mouth      Cholecalciferol (Vitamin D3) 250 MCG (62877 UT) TABS Take 10,000 Units by mouth daily      citalopram (CeleXA) 20 mg tablet TAKE ONE TABLET BY MOUTH EVERY MORNING (Patient taking differently: Take 20 mg by mouth daily Takes in evening) 90 tablet 0    Coenzyme Q10 100 MG TABS Take 100 mg by mouth daily      Digestive Enzymes (SIMILASE PO) Take 1 capsule by mouth 2 (two) times a day      diltiazem (CARDIZEM CD) 180 mg 24 hr capsule TAKE TWO CAPSULES BY MOUTH DAILY 60 capsule 5    Magnesium 400 MG CAPS Take by mouth      Milk Thistle 500 MG CAPS Take by mouth      Omega-3 Fatty Acids (fish oil) 1,000 mg Take 1,000 mg by mouth daily      thyroid ([place] Thyroid) 90 MG tablet TAKE ONE TABLET BY MOUTH IN THE MORNING (Patient taking differently: Take 90 mg by mouth every morning Takes 1/2 pill) 30 tablet 5    acetaminophen (TYLENOL) 500 mg tablet Take 2 tablets (1,000 mg total) by mouth every 6 (six) hours as needed for mild pain or moderate pain      ibuprofen (MOTRIN) 600 mg tablet Take 1 tablet (600 mg total) by mouth every 6 (six) hours as needed for mild pain or moderate pain      Lactobacillus TABS Take 1 tablet by mouth (Patient not taking: Reported on 3/20/2024)      oxyCODONE (ROXICODONE) 5 immediate release tablet 1 -2 tabs by mouth  "every 4-6 hour as needed 7 tablet 0     No current facility-administered medications for this visit.           Objective:    Blood pressure 136/86, pulse 77, temperature 98 °F (36.7 °C), temperature source Temporal, resp. rate 16, height 5' 6\" (1.676 m), weight 107 kg (234 lb 12.8 oz), SpO2 97%.  Body mass index is 37.9 kg/m².  Body surface area is 2.15 meters squared.    Physical Exam  Constitutional:       Appearance: She is well-developed.   HENT:      Head: Normocephalic and atraumatic.   Neck:      Thyroid: No thyromegaly.   Cardiovascular:      Rate and Rhythm: Normal rate and regular rhythm.      Heart sounds: Normal heart sounds.   Pulmonary:      Effort: Pulmonary effort is normal.      Breath sounds: Normal breath sounds.   Abdominal:      General: Bowel sounds are normal.      Palpations: Abdomen is soft.      Comments: Well healed laparoscopic incisions.   Genitourinary:     Comments: -Normal external female genitalia, normal Bartholin's and Hampton's glands                  -Normal midline urethral meatus. No lesions notes                  -Bladder without fullness mass or tenderness                  -Vagina without lesion or discharge No significant cystocele or rectocele noted                  -Cervix surgically absent                  -Uterus surgically absent                  -Adnexae surgically absent                  - Anus without fissure of lesion    Musculoskeletal:         General: Normal range of motion.      Cervical back: Normal range of motion and neck supple.   Lymphadenopathy:      Cervical: No cervical adenopathy.   Skin:     General: Skin is warm and dry.   Neurological:      Mental Status: She is alert and oriented to person, place, and time.   Psychiatric:         Behavior: Behavior normal.         No results found for: \"\"  Lab Results   Component Value Date    K 4.0 08/09/2024     08/09/2024    CO2 23 08/09/2024    BUN 15 08/09/2024    CREATININE 0.77 08/09/2024    GLUF 92 " "08/22/2023    CALCIUM 9.2 08/09/2024    AST 16 08/09/2024    ALT 20 08/09/2024    ALKPHOS 39 08/09/2024    EGFR 84 08/09/2024     Lab Results   Component Value Date    WBC 6.01 08/09/2024    HGB 14.8 08/09/2024    HCT 44.2 08/09/2024    MCV 86 08/09/2024     08/09/2024     Lab Results   Component Value Date    NEUTROABS 3.12 08/09/2024        Trend:  No results found for: \"\"              "

## 2024-09-23 NOTE — ASSESSMENT & PLAN NOTE
Patient is a very pleasant 59-year-old female with a history of large fibroid uterus measuring approximately 590 g status post robot hysterectomy BSO.  She has no evidence of persistent hyperplasia.  She is healing well from surgery.  Routin instructions for liberalization of activities has been discussed.  Patient will follow-up on an as-needed basis.  She will continue to use over-the-counter stool softeners for her constipation.

## 2024-09-25 ENCOUNTER — OFFICE VISIT (OUTPATIENT)
Dept: FAMILY MEDICINE CLINIC | Facility: CLINIC | Age: 59
End: 2024-09-25
Payer: COMMERCIAL

## 2024-09-25 VITALS
SYSTOLIC BLOOD PRESSURE: 128 MMHG | HEART RATE: 76 BPM | DIASTOLIC BLOOD PRESSURE: 74 MMHG | HEIGHT: 66 IN | WEIGHT: 233.2 LBS | OXYGEN SATURATION: 97 % | BODY MASS INDEX: 37.48 KG/M2

## 2024-09-25 DIAGNOSIS — E03.9 ACQUIRED HYPOTHYROIDISM: ICD-10-CM

## 2024-09-25 DIAGNOSIS — Z12.11 SCREENING FOR COLON CANCER: ICD-10-CM

## 2024-09-25 DIAGNOSIS — I10 PRIMARY HYPERTENSION: ICD-10-CM

## 2024-09-25 DIAGNOSIS — I48.91 ATRIAL FIBRILLATION, UNSPECIFIED TYPE (HCC): ICD-10-CM

## 2024-09-25 DIAGNOSIS — G47.33 OBSTRUCTIVE SLEEP APNEA SYNDROME: ICD-10-CM

## 2024-09-25 DIAGNOSIS — Z00.00 ANNUAL PHYSICAL EXAM: Primary | ICD-10-CM

## 2024-09-25 PROBLEM — N85.00 ENDOMETRIAL HYPERPLASIA WITHOUT ATYPIA: Status: RESOLVED | Noted: 2024-04-03 | Resolved: 2024-09-25

## 2024-09-25 PROBLEM — D25.1 INTRAMURAL LEIOMYOMA OF UTERUS: Status: RESOLVED | Noted: 2024-07-01 | Resolved: 2024-09-25

## 2024-09-25 PROCEDURE — 99396 PREV VISIT EST AGE 40-64: CPT | Performed by: NURSE PRACTITIONER

## 2024-09-25 RX ORDER — THYROID 90 MG/1
45 TABLET ORAL DAILY
Start: 2024-09-25

## 2024-09-25 NOTE — PATIENT INSTRUCTIONS
"Patient Education     Routine physical for adults   The Basics   Written by the doctors and editors at [facility]   What is a physical? -- A physical is a routine visit, or \"check-up,\" with your doctor. You might also hear it called a \"wellness visit\" or \"preventive visit.\"  During each visit, the doctor will:   Ask about your physical and mental health   Ask about your habits, behaviors, and lifestyle   Do an exam   Give you vaccines if needed   Talk to you about any medicines you take   Give advice about your health   Answer your questions  Getting regular check-ups is an important part of taking care of your health. It can help your doctor find and treat any problems you have. But it's also important for preventing health problems.  A routine physical is different from a \"sick visit.\" A sick visit is when you see a doctor because of a health concern or problem. Since physicals are scheduled ahead of time, you can think about what you want to ask the doctor.  How often should I get a physical? -- It depends on your age and health. In general, for people age 21 years and older:   If you are younger than 50 years, you might be able to get a physical every 3 years.   If you are 50 years or older, your doctor might recommend a physical every year.  If you have an ongoing health condition, like diabetes or high blood pressure, your doctor will probably want to see you more often.  What happens during a physical? -- In general, each visit will include:   Physical exam - The doctor or nurse will check your height, weight, heart rate, and blood pressure. They will also look at your eyes and ears. They will ask about how you are feeling and whether you have any symptoms that bother you.   Medicines - It's a good idea to bring a list of all the medicines you take to each doctor visit. Your doctor will talk to you about your medicines and answer any questions. Tell them if you are having any side effects that bother you. You " "should also tell them if you are having trouble paying for any of your medicines.   Habits and behaviors - This includes:   Your diet   Your exercise habits   Whether you smoke, drink alcohol, or use drugs   Whether you are sexually active   Whether you feel safe at home  Your doctor will talk to you about things you can do to improve your health and lower your risk of health problems. They will also offer help and support. For example, if you want to quit smoking, they can give you advice and might prescribe medicines. If you want to improve your diet or get more physical activity, they can help you with this, too.   Lab tests, if needed - The tests you get will depend on your age and situation. For example, your doctor might want to check your:   Cholesterol   Blood sugar   Iron level   Vaccines - The recommended vaccines will depend on your age, health, and what vaccines you already had. Vaccines are very important because they can prevent certain serious or deadly infections.   Discussion of screening - \"Screening\" means checking for diseases or other health problems before they cause symptoms. Your doctor can recommend screening based on your age, risk, and preferences. This might include tests to check for:   Cancer, such as breast, prostate, cervical, ovarian, colorectal, prostate, lung, or skin cancer   Sexually transmitted infections, such as chlamydia and gonorrhea   Mental health conditions like depression and anxiety  Your doctor will talk to you about the different types of screening tests. They can help you decide which screenings to have. They can also explain what the results might mean.   Answering questions - The physical is a good time to ask the doctor or nurse questions about your health. If needed, they can refer you to other doctors or specialists, too.  Adults older than 65 years often need other care, too. As you get older, your doctor will talk to you about:   How to prevent falling at " home   Hearing or vision tests   Memory testing   How to take your medicines safely   Making sure that you have the help and support you need at home  All topics are updated as new evidence becomes available and our peer review process is complete.  This topic retrieved from [employer] on: May 02, 2024.  Topic 273901 Version 1.0  Release: 32.4.3 - C32.122  © 2024 UpToDate, Inc. and/or its affiliates. All rights reserved.  Consumer Information Use and Disclaimer   Disclaimer: This generalized information is a limited summary of diagnosis, treatment, and/or medication information. It is not meant to be comprehensive and should be used as a tool to help the user understand and/or assess potential diagnostic and treatment options. It does NOT include all information about conditions, treatments, medications, side effects, or risks that may apply to a specific patient. It is not intended to be medical advice or a substitute for the medical advice, diagnosis, or treatment of a health care provider based on the health care provider's examination and assessment of a patient's specific and unique circumstances. Patients must speak with a health care provider for complete information about their health, medical questions, and treatment options, including any risks or benefits regarding use of medications. This information does not endorse any treatments or medications as safe, effective, or approved for treating a specific patient. UpToDate, Inc. and its affiliates disclaim any warranty or liability relating to this information or the use thereof.The use of this information is governed by the Terms of Use, available at https://www.w[employer]tersAztek Networksuwer.com/en/know/clinical-effectiveness-terms. 2024© UpToDate, Inc. and its affiliates and/or licensors. All rights reserved.  Copyright   © 2024 UpToDate, Inc. and/or its affiliates. All rights reserved.

## 2024-09-25 NOTE — PROGRESS NOTES
Adult Annual Physical  Name: Lenka Wayne      : 1965      MRN: [id number]  Encounter Provider: PIERRE Mclaughlin  Encounter Date: 2024   Encounter department: [facility]    Assessment & Plan  Annual physical exam         BMI 37.0-37.9, adult  Recent surgery but is now fully mobile again.         Atrial fibrillation, unspecified type (HCC)  Controlled, continues with cardizem and          Primary hypertension  Within normal parameters         Acquired hypothyroidism    Orders:    thyroid (ARMOUR) 90 MG tablet; Take 0.5 tablets (45 mg total) by mouth daily    Obstructive sleep apnea syndrome  Continues with CPAP         Screening for colon cancer  Referral placed to GI.    Orders:    Ambulatory Referral to Gastroenterology; Future      Immunizations and preventive care screenings were discussed with patient today. Appropriate education was printed on patient's after visit summary.    Counseling:  Sexual health: discussed sexually transmitted diseases, partner selection, use of condoms, avoidance of unintended pregnancy, and contraceptive alternatives.         History of Present Illness     Adult Annual Physical:  Patient presents for annual physical. Reports no acute issues.  Recent hysterectomy - no complications.  .     Diet and Physical Activity:  - Diet/Nutrition: well balanced diet.  - Exercise: walking.    Depression Screening:    - PHQ-9 Score: 3    General Health:  - Sleep: sleeps well.  - Hearing: normal hearing right ear and normal hearing left ear.  - Vision: wears glasses and no vision problems.  - Dental: brushes teeth twice daily.    /GYN Health:  - Follows with GYN: yes.   - Menopause: postmenopausal.   - History of STDs: no    Advanced Care Planning:  - Has an advanced directive?: no    - Has a durable medical POA?: no    - ACP document given to patient?: no      Review of Systems   Constitutional: Negative.    HENT: Negative.     Respiratory:  Negative.     Cardiovascular: Negative.    Gastrointestinal: Negative.    Neurological: Negative.    All other systems reviewed and are negative.    Current Outpatient Medications on File Prior to Visit   Medication Sig Dispense Refill    atorvastatin (LIPITOR) 10 mg tablet TAKE ONE TABLET BY MOUTH EVERY MORNING (Patient taking differently: Take 10 mg by mouth daily Takes in PM) 30 tablet 5    B Complex Vitamins (Vitamin B Complex) TABS Take 2 tablets by mouth      Barberry-Oreg Grape-Goldenseal 200-200-50 MG CAPS Take 500 mg by mouth      Cholecalciferol (Vitamin D3) 250 MCG (91247 UT) TABS Take 10,000 Units by mouth daily      citalopram (CeleXA) 20 mg tablet TAKE ONE TABLET BY MOUTH EVERY MORNING (Patient taking differently: Take 20 mg by mouth daily Takes in evening) 90 tablet 0    Coenzyme Q10 100 MG TABS Take 100 mg by mouth daily      Digestive Enzymes (SIMILASE PO) Take 1 capsule by mouth 2 (two) times a day      diltiazem (CARDIZEM CD) 180 mg 24 hr capsule TAKE TWO CAPSULES BY MOUTH DAILY 60 capsule 5    Magnesium 400 MG CAPS Take by mouth      Milk Thistle 500 MG CAPS Take by mouth      Omega-3 Fatty Acids (fish oil) 1,000 mg Take 1,000 mg by mouth daily      [DISCONTINUED] thyroid ([place] Thyroid) 90 MG tablet TAKE ONE TABLET BY MOUTH IN THE MORNING (Patient taking differently: Take 45 mg by mouth every morning Takes 1/2 pill) 30 tablet 5    [DISCONTINUED] acetaminophen (TYLENOL) 500 mg tablet Take 2 tablets (1,000 mg total) by mouth every 6 (six) hours as needed for mild pain or moderate pain      [DISCONTINUED] ibuprofen (MOTRIN) 600 mg tablet Take 1 tablet (600 mg total) by mouth every 6 (six) hours as needed for mild pain or moderate pain      [DISCONTINUED] Lactobacillus TABS Take 1 tablet by mouth (Patient not taking: Reported on 3/20/2024)      [DISCONTINUED] oxyCODONE (ROXICODONE) 5 immediate release tablet 1 -2 tabs by mouth every 4-6 hour as needed 7 tablet 0     No current facility-administered  "medications on file prior to visit.        Objective     /74 (BP Location: Left arm, Patient Position: Sitting, Cuff Size: Standard)   Pulse 76   Ht 5' 6\" (1.676 m)   Wt 106 kg (233 lb 3.2 oz)   SpO2 97%   BMI 37.64 kg/m²     Physical Exam  Cardiovascular:      Rate and Rhythm: Normal rate and regular rhythm.      Heart sounds: Normal heart sounds.   Pulmonary:      Effort: Pulmonary effort is normal.      Breath sounds: Normal breath sounds.   Neurological:      General: No focal deficit present.      Mental Status: She is alert and oriented to person, place, and time.         "

## 2024-10-07 DIAGNOSIS — F41.9 ANXIETY: ICD-10-CM

## 2024-10-08 RX ORDER — CITALOPRAM HYDROBROMIDE 20 MG/1
20 TABLET ORAL EVERY MORNING
Qty: 90 TABLET | Refills: 1 | Status: SHIPPED | OUTPATIENT
Start: 2024-10-08

## 2024-10-10 ENCOUNTER — TELEPHONE (OUTPATIENT)
Age: 59
End: 2024-10-10

## 2024-10-10 NOTE — TELEPHONE ENCOUNTER
10/10/24  Screened by: Desiree Caceres MA    Referring Provider Tanya Erazo    Pre- Screening:     BMI  37.64  Has patient been referred for a routine screening Colonoscopy? yes  Is the patient between 45-75 years old? yes      Previous Colonoscopy yes   If yes:    Date: 2014    Facility:     Reason: Screening      Does the patient want to see a Gastroenterologist prior to their procedure OR are they having any GI symptoms? no    Has the patient been hospitalized or had abdominal surgery in the past 6 months? yes    Does the patient use supplemental oxygen? no    Does the patient take Coumadin, Lovenox, Plavix, Elliquis, Xarelto, or other blood thinning medication? yes    Has the patient had a stroke, cardiac event, or stent placed in the past year? no      If patient is between 45yrs - 49yrs, please advise patient that we will have to confirm benefits & coverage with their insurance company for a routine screening colonoscopy.

## 2024-11-11 ENCOUNTER — VBI (OUTPATIENT)
Dept: ADMINISTRATIVE | Facility: OTHER | Age: 59
End: 2024-11-11

## 2024-11-11 ENCOUNTER — HOSPITAL ENCOUNTER (OUTPATIENT)
Dept: RADIOLOGY | Facility: CLINIC | Age: 59
Discharge: HOME | End: 2024-11-11
Payer: COMMERCIAL

## 2024-11-11 VITALS — WEIGHT: 235 LBS | HEIGHT: 67 IN | BODY MASS INDEX: 36.88 KG/M2

## 2024-11-11 DIAGNOSIS — Z12.31 ENCOUNTER FOR SCREENING MAMMOGRAM FOR BREAST CANCER: ICD-10-CM

## 2024-11-11 PROCEDURE — 77067 SCR MAMMO BI INCL CAD: CPT

## 2024-11-11 PROCEDURE — 77063 BREAST TOMOSYNTHESIS BI: CPT

## 2024-11-12 ENCOUNTER — RA CDI HCC (OUTPATIENT)
Dept: OTHER | Facility: HOSPITAL | Age: 59
End: 2024-11-12

## 2024-12-08 LAB — COLOGUARD RESULT REPORTABLE: NEGATIVE

## 2024-12-10 ENCOUNTER — OFFICE VISIT (OUTPATIENT)
Dept: URGENT CARE | Facility: CLINIC | Age: 59
End: 2024-12-10
Payer: COMMERCIAL

## 2024-12-10 VITALS
RESPIRATION RATE: 18 BRPM | TEMPERATURE: 97.9 F | WEIGHT: 246.8 LBS | BODY MASS INDEX: 38.74 KG/M2 | HEART RATE: 73 BPM | OXYGEN SATURATION: 98 % | DIASTOLIC BLOOD PRESSURE: 81 MMHG | SYSTOLIC BLOOD PRESSURE: 143 MMHG | HEIGHT: 67 IN

## 2024-12-10 DIAGNOSIS — B30.9 VIRAL CONJUNCTIVITIS, BOTH EYES: Primary | ICD-10-CM

## 2024-12-10 PROCEDURE — G0382 LEV 3 HOSP TYPE B ED VISIT: HCPCS

## 2024-12-10 PROCEDURE — S9083 URGENT CARE CENTER GLOBAL: HCPCS

## 2024-12-10 NOTE — LETTER
December 10, 2024     Patient: Lenka Wayne   YOB: 1965   Date of Visit: 12/10/2024       To Whom it May Concern:    Lenka Wayne was seen in my clinic on 12/10/2024. She may return to work on 12/12/24 .         Sincerely,          PIERRE Eagle

## 2024-12-10 NOTE — PROGRESS NOTES
[facility]        NAME: Lenka Wayne is a 59 y.o. female  : 1965    MRN: [id number]  DATE: December 10, 2024  TIME: 4:51 PM    Assessment and Plan   Viral conjunctivitis, both eyes [B30.9]  1. Viral conjunctivitis, both eyes              Patient Instructions   Tylenol/ibuprofen for pain fever. Continue using lubricating drops for discomfort.    There are THREE (3) types of Conjunctivitis.   ONLY 1 type requires the use of antibiotic drops.     Contact lenses should be avoided during any episodes of conjunctivitis.     1-Allergic Conjunctivitis: Symptoms consist of eye redness, itching, and increased tears. Treatment includes avoiding the allergen and antihistamines medications either by mouth or directly into the eye.   There is no need to miss school with allergic conjunctivitis.     2-Viral Conjunctivitis: Symptoms consist of itchy/uncomfortable irritation to the eyes, tearing, redness, discharge (particularly in the morning). This is usually associated with a viral illness. It is still important to wash hands thoroughly to avoid sharing germs. This will often occur with sinus congestion, cough, runny nose.  Students may return back to school with viral conjunctivitis provided they are fever free x 24 hours without fever reducing medicine     3-Bacterial Conjunctivitis: more commonly associated with discharge (pus), which can lead to eyelids sticking together. The discharge from the eyes will continue throughout the day not only in the morning. The discharge is usually a green/yellow color. This sometimes occurs with an ear infection.   Bacterial conjunctivitis should be treated with eyedrops for at least 24 hours prior to returning to school.     At times viral conjunctivitis may become a bacterial conjunctivitis if the patient is rubbing their eyes and introduces a bacterium into the area of the eye. However, the use of antibiotic drops and a viral conjunctivitis will not improve your  symptoms, and should be reserved for bacterial conjunctivitis only.      Follow up with PCP in 3-5 days.  Proceed to  ER if symptoms worsen.    If tests have been performed at [facility], our office will contact you with results if changes need to be made to the care plan discussed with you at the visit.  You can review your full results on [facility].    Chief Complaint     Chief Complaint   Patient presents with    Eye Problem     Patient presents with redness on her eyes and patient has her eyes crusted shut this AM. Patient denies discharge throughout the day.          History of Present Illness       Eye Problem   Both eyes are affected. This is a new problem. The current episode started today. The problem occurs constantly. The problem has been gradually worsening. There was no injury mechanism. The pain is at a severity of 1/10. The pain is mild. There is No known exposure to pink eye. She Wears contacts (Daily lenses). Associated symptoms include eye redness, a foreign body sensation and itching. Pertinent negatives include no blurred vision, eye discharge, double vision, fever, nausea, photophobia, vomiting or weakness. She has tried eye drops (Moisturizing eyedrops) for the symptoms. The treatment provided mild relief.       Review of Systems   Review of Systems   Constitutional:  Negative for chills, fatigue and fever.   HENT:  Negative for congestion, ear pain, rhinorrhea and sore throat.    Eyes:  Positive for pain, redness and itching. Negative for blurred vision, double vision, photophobia, discharge and visual disturbance.   Respiratory:  Negative for cough, chest tightness and shortness of breath.    Cardiovascular:  Negative for chest pain and palpitations.   Gastrointestinal:  Negative for abdominal pain, nausea and vomiting.   Musculoskeletal:  Negative for myalgias.   Skin:  Negative for pallor and rash.   Neurological:  Negative for dizziness, facial asymmetry, weakness, light-headedness  and headaches.   All other systems reviewed and are negative.        Current Medications       Current Outpatient Medications:     atorvastatin (LIPITOR) 10 mg tablet, TAKE ONE TABLET BY MOUTH EVERY MORNING, Disp: 30 tablet, Rfl: 5    B Complex Vitamins (Vitamin B Complex) TABS, Take 2 tablets by mouth, Disp: , Rfl:     Barberry-Oreg Grape-Goldenseal 200-200-50 MG CAPS, Take 500 mg by mouth, Disp: , Rfl:     Cholecalciferol (Vitamin D3) 250 MCG (21983 UT) TABS, Take 10,000 Units by mouth daily, Disp: , Rfl:     citalopram (CeleXA) 20 mg tablet, TAKE ONE TABLET BY MOUTH IN THE MORNING, Disp: 90 tablet, Rfl: 1    Coenzyme Q10 100 MG TABS, Take 100 mg by mouth daily, Disp: , Rfl:     Digestive Enzymes (SIMILASE PO), Take 1 capsule by mouth 2 (two) times a day, Disp: , Rfl:     diltiazem (CARDIZEM CD) 180 mg 24 hr capsule, TAKE TWO CAPSULES BY MOUTH DAILY, Disp: 60 capsule, Rfl: 5    Magnesium 400 MG CAPS, Take by mouth, Disp: , Rfl:     Milk Thistle 500 MG CAPS, Take by mouth, Disp: , Rfl:     Omega-3 Fatty Acids (fish oil) 1,000 mg, Take 1,000 mg by mouth daily, Disp: , Rfl:     thyroid (ARMOUR) 90 MG tablet, Take 0.5 tablets (45 mg total) by mouth daily, Disp: , Rfl:     Current Allergies     Allergies as of 12/10/2024 - Reviewed 12/10/2024   Allergen Reaction Noted    Food Other (See Comments) 03/15/2023    Other Other (See Comments) 03/15/2023    Penicillins Other (See Comments) 09/13/2022    Wheat bran - food allergy Abdominal Pain 09/13/2022    Horse-derived products Rash 09/26/2022            The following portions of the patient's history were reviewed and updated as appropriate: allergies, current medications, past family history, past medical history, past social history, past surgical history and problem list.     Past Medical History:   Diagnosis Date    Allergic 1971    Anxiety 1993    Arthritis     Atrial fibrillation (HCC) 10/2020    CPAP (continuous positive airway pressure) dependence     Depression  "1993    Disease of thyroid gland     Environmental allergies     Fibroids     uterine   LTH  BSO  today   9/6/2024    Hyperlipidemia     Hypertension     Intramural leiomyoma of uterus 07/01/2024    Obesity Post puberty    Sleep apnea        Past Surgical History:   Procedure Laterality Date    BREAST BIOPSY Left 2012    stereotactic bx - benign    COLONOSCOPY      HYSTERECTOMY  06/06/2024    JOINT REPLACEMENT      KNEE SURGERY Left 02/17/2023    OOPHORECTOMY Bilateral 09/06/2024    ND HYSTEROSCOPY BX ENDOMETRIUM&/POLYPC W/WO D&C N/A 09/08/2023    Procedure: (D&C) W/ HYSTEROSCOPY;  Surgeon: Barbara Parr MD;  [facility];  Service: Gynecology    ND LAPS TOTAL HYSTERECT 250 GM/< W/RMVL TUBE/OVARY N/A 09/06/2024    Procedure: ROBOTIC ASSISTED TOTAL LAPAROSCOPIC HYSTERECTOMY, BSO, EUA;  Surgeon: Kody Brewster MD;  [facility];  Service: Gynecology Oncology       Family History   Problem Relation Age of Onset    Cancer Mother     Depression Mother     Hypertension Mother     Heart disease Mother     Thyroid disease Mother     Arthritis Mother     Esophageal cancer Father     Prostate cancer Father     Alcohol abuse Father     Hypertension Father     Heart disease Father     Cancer Father     No Known Problems Sister     No Known Problems Sister     No Known Problems Maternal Grandmother     Prostate cancer Maternal Grandfather     No Known Problems Paternal Grandmother     No Known Problems Paternal Grandfather     Stomach cancer Maternal Aunt     Leukemia Maternal Aunt     Breast cancer Maternal Aunt     Breast cancer Paternal Aunt     No Known Problems Paternal Aunt     Depression Sister     Hypertension Sister     Heart disease Sister     Depression Sister     Heart disease Sister          Medications have been verified.        Objective   /81   Pulse 73   Temp 97.9 °F (36.6 °C) (Tympanic)   Resp 18   Ht 5' 6.5\" (1.689 m)   Wt 112 kg (246 lb 12.8 oz)   SpO2 98%   BMI 39.24 kg/m² "   No LMP recorded. Patient has had a hysterectomy.       Physical Exam     Physical Exam  Vitals and nursing note reviewed.   Constitutional:       Appearance: Normal appearance. She is not ill-appearing.   HENT:      Right Ear: Tympanic membrane, ear canal and external ear normal.      Left Ear: Tympanic membrane, ear canal and external ear normal.      Nose: No congestion or rhinorrhea.      Mouth/Throat:      Mouth: Mucous membranes are moist.      Pharynx: Oropharynx is clear. No oropharyngeal exudate or posterior oropharyngeal erythema.   Eyes:      General: Lids are normal. Lids are everted, no foreign bodies appreciated. Vision grossly intact. No visual field deficit.        Right eye: No foreign body, discharge or hordeolum.         Left eye: No foreign body, discharge or hordeolum.      Extraocular Movements: Extraocular movements intact.      Right eye: Normal extraocular motion.      Left eye: Normal extraocular motion.      Conjunctiva/sclera:      Right eye: Right conjunctiva is injected. No exudate.     Left eye: Left conjunctiva is injected. No exudate.     Pupils: Pupils are equal, round, and reactive to light.      Comments: Sclera & conjunctivae injected bilaterally. No exudates present.   Cardiovascular:      Rate and Rhythm: Normal rate and regular rhythm.      Pulses: Normal pulses.      Heart sounds: Normal heart sounds.   Pulmonary:      Effort: Pulmonary effort is normal. No respiratory distress.      Breath sounds: Normal breath sounds. No stridor. No wheezing, rhonchi or rales.   Chest:      Chest wall: No tenderness.   Musculoskeletal:         General: Normal range of motion.      Cervical back: Normal range of motion and neck supple. No tenderness.   Lymphadenopathy:      Cervical: No cervical adenopathy.   Skin:     General: Skin is warm and dry.      Capillary Refill: Capillary refill takes less than 2 seconds.      Coloration: Skin is not pale.      Findings: No erythema or rash.    Neurological:      General: No focal deficit present.      Mental Status: She is alert. Mental status is at baseline.   Psychiatric:         Mood and Affect: Mood normal.         Behavior: Behavior normal.         Thought Content: Thought content normal.         Judgment: Judgment normal.

## 2024-12-10 NOTE — PATIENT INSTRUCTIONS
Tylenol/ibuprofen for pain fever. Continue using lubricating drops for discomfort.    There are THREE (3) types of Conjunctivitis.   ONLY 1 type requires the use of antibiotic drops.     Contact lenses should be avoided during any episodes of conjunctivitis.     1-Allergic Conjunctivitis: Symptoms consist of eye redness, itching, and increased tears. Treatment includes avoiding the allergen and antihistamines medications either by mouth or directly into the eye.   There is no need to miss school with allergic conjunctivitis.     2-Viral Conjunctivitis: Symptoms consist of itchy/uncomfortable irritation to the eyes, tearing, redness, discharge (particularly in the morning). This is usually associated with a viral illness. It is still important to wash hands thoroughly to avoid sharing germs. This will often occur with sinus congestion, cough, runny nose.  Students may return back to school with viral conjunctivitis provided they are fever free x 24 hours without fever reducing medicine     3-Bacterial Conjunctivitis: more commonly associated with discharge (pus), which can lead to eyelids sticking together. The discharge from the eyes will continue throughout the day not only in the morning. The discharge is usually a green/yellow color. This sometimes occurs with an ear infection.   Bacterial conjunctivitis should be treated with eyedrops for at least 24 hours prior to returning to school.     At times viral conjunctivitis may become a bacterial conjunctivitis if the patient is rubbing their eyes and introduces a bacterium into the area of the eye. However, the use of antibiotic drops and a viral conjunctivitis will not improve your symptoms, and should be reserved for bacterial conjunctivitis only.      Follow up with PCP in 3-5 days.  Proceed to  ER if symptoms worsen.    If tests have been performed at [facility], our office will contact you with results if changes need to be made to the care plan discussed with  you at the visit.  You can review your full results on [facility] MyChart.

## 2024-12-15 DIAGNOSIS — E78.2 MIXED HYPERLIPIDEMIA: ICD-10-CM

## 2024-12-16 RX ORDER — ATORVASTATIN CALCIUM 10 MG/1
10 TABLET, FILM COATED ORAL EVERY MORNING
Qty: 30 TABLET | Refills: 0 | Status: SHIPPED | OUTPATIENT
Start: 2024-12-16

## 2025-01-09 DIAGNOSIS — E78.2 MIXED HYPERLIPIDEMIA: ICD-10-CM

## 2025-01-13 RX ORDER — ATORVASTATIN CALCIUM 10 MG/1
10 TABLET, FILM COATED ORAL EVERY MORNING
Qty: 90 TABLET | Refills: 0 | Status: SHIPPED | OUTPATIENT
Start: 2025-01-13

## 2025-01-15 ENCOUNTER — CONSULT (OUTPATIENT)
Dept: GASTROENTEROLOGY | Facility: MEDICAL CENTER | Age: 60
End: 2025-01-15
Payer: COMMERCIAL

## 2025-01-15 ENCOUNTER — TELEPHONE (OUTPATIENT)
Age: 60
End: 2025-01-15

## 2025-01-15 ENCOUNTER — TELEPHONE (OUTPATIENT)
Dept: GASTROENTEROLOGY | Facility: MEDICAL CENTER | Age: 60
End: 2025-01-15

## 2025-01-15 VITALS
DIASTOLIC BLOOD PRESSURE: 84 MMHG | HEART RATE: 70 BPM | HEIGHT: 67 IN | BODY MASS INDEX: 38.61 KG/M2 | SYSTOLIC BLOOD PRESSURE: 134 MMHG | TEMPERATURE: 98.2 F | WEIGHT: 246 LBS | OXYGEN SATURATION: 98 %

## 2025-01-15 DIAGNOSIS — Z12.11 SCREENING FOR COLON CANCER: ICD-10-CM

## 2025-01-15 DIAGNOSIS — Z86.0100 HISTORY OF COLON POLYPS: Primary | ICD-10-CM

## 2025-01-15 PROCEDURE — 99203 OFFICE O/P NEW LOW 30 MIN: CPT | Performed by: NURSE PRACTITIONER

## 2025-01-15 RX ORDER — BISACODYL 5 MG/1
TABLET, DELAYED RELEASE ORAL
Qty: 4 TABLET | Refills: 0 | Status: SHIPPED | OUTPATIENT
Start: 2025-01-15

## 2025-01-15 RX ORDER — POLYETHYLENE GLYCOL 3350 17 G/17G
238 POWDER, FOR SOLUTION ORAL ONCE
Qty: 238 G | Refills: 0 | Status: SHIPPED | OUTPATIENT
Start: 2025-01-15 | End: 2025-01-15

## 2025-01-15 NOTE — PROGRESS NOTES
Name: Lenka Wayne      : 1965      MRN: [id number]  Encounter Provider: PIERRE Arias  Encounter Date: 1/15/2025   Encounter department: [facility]  :  Assessment & Plan  History of colon polyps  She has a past medical history of colon polyps.  Last colonoscopy was 10 years ago.  She was in a recall colonoscopy for 5 years but she was caring for her dying  at that time and did not have it.  Reports her BMs are brown and formed daily.  Denies any melena hematochezia. Grandfather had colon cancer age 90's.  No abdominal pain or weight loss.  Will rule out colon polyps and neoplasm.    Orders:    Colonoscopy; Future    polyethylene glycol (MiraLax) 17 GM/SCOOP powder; Take 238 g by mouth once for 1 dose Take 238 g my mouth. Use as directed    bisacodyl (DULCOLAX) 5 mg EC tablet; Take as directed by GI office    I reviewed with patient/family potential risks of endoscopic evaluation including possible infection, bleeding or perforation.  Patient/family verbalized understanding of potential risks and agreed to procedure(s).    History of Present Illness   HPI  Lenka Wayne is a 59 y.o. female who presents for colon cancer surveillance.  She has a past medical history of HTN, hypothyroidism, LARON, atrial fibrillation and HLD.    She has a past medical history of colon polyps.  Last colonoscopy was 10 years ago.  She was in a recall colonoscopy for 5 years but she was caring for her dying  at that time and did not have it.  Reports her BMs are brown and formed daily.  Denies any melena hematochezia. Grandfather had colon cancer age 90's.  No abdominal pain or weight loss.        No recent abdominal imaging to review.  Labs -CMP normal, CBC normal other than RBCs 5.14, TSH 1.3, Cologuard .    Prior EGD/colonoscopy    Colon 10 years ago- polyps.     History obtained from: patient    Review of Systems   Gastrointestinal:  Negative.    All other systems reviewed and are negative.    Medical History Reviewed by provider this encounter:  Tobacco  Allergies  Meds  Problems  Med Hx  Surg Hx  Fam Hx     .  Current Outpatient Medications on File Prior to Visit   Medication Sig Dispense Refill    atorvastatin (LIPITOR) 10 mg tablet TAKE ONE TABLET BY MOUTH IN THE MORNING 90 tablet 0    B Complex Vitamins (Vitamin B Complex) TABS Take 2 tablets by mouth      Barberry-Oreg Grape-Goldenseal 200-200-50 MG CAPS Take 500 mg by mouth      Cholecalciferol (Vitamin D3) 250 MCG (11701 UT) TABS Take 10,000 Units by mouth daily      citalopram (CeleXA) 20 mg tablet TAKE ONE TABLET BY MOUTH IN THE MORNING 90 tablet 1    Coenzyme Q10 100 MG TABS Take 100 mg by mouth daily      Digestive Enzymes (SIMILASE PO) Take 1 capsule by mouth 2 (two) times a day      diltiazem (CARDIZEM CD) 180 mg 24 hr capsule TAKE TWO CAPSULES BY MOUTH DAILY 60 capsule 5    Magnesium 400 MG CAPS Take by mouth      Milk Thistle 500 MG CAPS Take by mouth      Omega-3 Fatty Acids (fish oil) 1,000 mg Take 1,000 mg by mouth daily      thyroid (ARMOUR) 90 MG tablet Take 0.5 tablets (45 mg total) by mouth daily       No current facility-administered medications on file prior to visit.      Social History     Tobacco Use    Smoking status: Never     Passive exposure: Never    Smokeless tobacco: Never   Vaping Use    Vaping status: Never Used   Substance and Sexual Activity    Alcohol use: Not Currently    Drug use: Never     Comment: Recovering alcoholic 1991    Sexual activity: Not Currently     Comment: I’m a         Objective   There were no vitals taken for this visit.     Physical Exam  Constitutional:       Appearance: Normal appearance.   HENT:      Head: Normocephalic.   Cardiovascular:      Rate and Rhythm: Normal rate and regular rhythm.      Heart sounds: Normal heart sounds.   Pulmonary:      Effort: Pulmonary effort is normal.      Breath sounds: Normal breath  sounds.   Abdominal:      General: Bowel sounds are normal.      Palpations: Abdomen is soft.      Tenderness: There is no abdominal tenderness.   Skin:     General: Skin is warm and dry.   Neurological:      Mental Status: She is alert and oriented to person, place, and time.

## 2025-01-15 NOTE — TELEPHONE ENCOUNTER
Please be advised the preps Miralax and dulcolax needsa prior auth.  A clinical reason is needed so a prior auth can be done by the PA team of why this particular prep is needed with tired and fails of other preps with documentation.  Otherwise, please advise the Provider so a different prep can be prescribed.  Thank you

## 2025-01-15 NOTE — TELEPHONE ENCOUNTER
Scheduled date of Colonoscopy (as of today) February 5  Physician performing: Dr. Hogue  Location of procedure:  [facility] [place]  Instructions given to patient: Luis  Clearances: None  Diabetic: No

## 2025-01-20 NOTE — TELEPHONE ENCOUNTER
Called pt to try and let her know that she can purchase otc for miralax dulcolax but was unable to get a hold of the pt and unable to leave a voicemail will call pt back later today

## 2025-01-27 NOTE — TELEPHONE ENCOUNTER
Called pt to review that she can purchase miralax ducolax OTC was unable to talk to the pt and left a voicemail message for the pt

## 2025-01-30 ENCOUNTER — TELEPHONE (OUTPATIENT)
Dept: GASTROENTEROLOGY | Facility: MEDICAL CENTER | Age: 60
End: 2025-01-30

## 2025-01-30 NOTE — TELEPHONE ENCOUNTER
Confirming Upcoming Procedure: Colonoscopy on Feb 5  Physician performing: Dr. Hogue  Location of procedure:  [facility]  Prep: Miralax   Diabetic: No

## 2025-02-03 ENCOUNTER — TELEPHONE (OUTPATIENT)
Age: 60
End: 2025-02-03

## 2025-02-03 NOTE — TELEPHONE ENCOUNTER
Pt called to r/s her colonoscopy to a Monday due to the prep . I called [facility] as an FYI and the pt r/s from 2/5/25 to 3/3/25

## 2025-02-17 ENCOUNTER — ANESTHESIA (OUTPATIENT)
Dept: ANESTHESIOLOGY | Facility: HOSPITAL | Age: 60
End: 2025-02-17

## 2025-02-17 ENCOUNTER — ANESTHESIA EVENT (OUTPATIENT)
Dept: ANESTHESIOLOGY | Facility: HOSPITAL | Age: 60
End: 2025-02-17

## 2025-03-03 ENCOUNTER — HOSPITAL ENCOUNTER (OUTPATIENT)
Dept: GASTROENTEROLOGY | Facility: MEDICAL CENTER | Age: 60
Setting detail: OUTPATIENT SURGERY
Discharge: HOME | End: 2025-03-03
Payer: COMMERCIAL

## 2025-03-03 ENCOUNTER — ANESTHESIA (OUTPATIENT)
Dept: GASTROENTEROLOGY | Facility: MEDICAL CENTER | Age: 60
End: 2025-03-03
Payer: COMMERCIAL

## 2025-03-03 ENCOUNTER — ANESTHESIA EVENT (OUTPATIENT)
Dept: GASTROENTEROLOGY | Facility: MEDICAL CENTER | Age: 60
End: 2025-03-03
Payer: COMMERCIAL

## 2025-03-03 VITALS
BODY MASS INDEX: 38.61 KG/M2 | OXYGEN SATURATION: 98 % | HEART RATE: 62 BPM | HEIGHT: 67 IN | SYSTOLIC BLOOD PRESSURE: 136 MMHG | RESPIRATION RATE: 18 BRPM | DIASTOLIC BLOOD PRESSURE: 77 MMHG | TEMPERATURE: 97.6 F | WEIGHT: 246 LBS

## 2025-03-03 DIAGNOSIS — Z86.0100 HISTORY OF COLON POLYPS: ICD-10-CM

## 2025-03-03 PROCEDURE — 45385 COLONOSCOPY W/LESION REMOVAL: CPT | Performed by: INTERNAL MEDICINE

## 2025-03-03 PROCEDURE — 88305 TISSUE EXAM BY PATHOLOGIST: CPT | Performed by: PATHOLOGY

## 2025-03-03 RX ORDER — LIDOCAINE HYDROCHLORIDE 20 MG/ML
INJECTION, SOLUTION EPIDURAL; INFILTRATION; INTRACAUDAL; PERINEURAL AS NEEDED
Status: DISCONTINUED | OUTPATIENT
Start: 2025-03-03 | End: 2025-03-03

## 2025-03-03 RX ORDER — SODIUM CHLORIDE, SODIUM LACTATE, POTASSIUM CHLORIDE, CALCIUM CHLORIDE 600; 310; 30; 20 MG/100ML; MG/100ML; MG/100ML; MG/100ML
125 INJECTION, SOLUTION INTRAVENOUS CONTINUOUS
Status: DISCONTINUED | OUTPATIENT
Start: 2025-03-03 | End: 2025-03-07 | Stop reason: HOSPADM

## 2025-03-03 RX ORDER — PROPOFOL 10 MG/ML
INJECTION, EMULSION INTRAVENOUS AS NEEDED
Status: DISCONTINUED | OUTPATIENT
Start: 2025-03-03 | End: 2025-03-03

## 2025-03-03 RX ORDER — PROPOFOL 10 MG/ML
INJECTION, EMULSION INTRAVENOUS CONTINUOUS PRN
Status: DISCONTINUED | OUTPATIENT
Start: 2025-03-03 | End: 2025-03-03

## 2025-03-03 RX ORDER — SODIUM CHLORIDE, SODIUM LACTATE, POTASSIUM CHLORIDE, CALCIUM CHLORIDE 600; 310; 30; 20 MG/100ML; MG/100ML; MG/100ML; MG/100ML
125 INJECTION, SOLUTION INTRAVENOUS CONTINUOUS
Status: CANCELLED | OUTPATIENT
Start: 2025-03-03

## 2025-03-03 RX ADMIN — PROPOFOL 100 MCG/KG/MIN: 10 INJECTION, EMULSION INTRAVENOUS at 11:21

## 2025-03-03 RX ADMIN — LIDOCAINE HYDROCHLORIDE 80 MG: 20 INJECTION, SOLUTION EPIDURAL; INFILTRATION; INTRACAUDAL at 11:16

## 2025-03-03 RX ADMIN — SODIUM CHLORIDE, SODIUM LACTATE, POTASSIUM CHLORIDE, AND CALCIUM CHLORIDE 125 ML/HR: .6; .31; .03; .02 INJECTION, SOLUTION INTRAVENOUS at 11:08

## 2025-03-03 RX ADMIN — PROPOFOL 50 MG: 10 INJECTION, EMULSION INTRAVENOUS at 11:19

## 2025-03-03 RX ADMIN — PROPOFOL 50 MG: 10 INJECTION, EMULSION INTRAVENOUS at 11:17

## 2025-03-03 RX ADMIN — PROPOFOL 100 MG: 10 INJECTION, EMULSION INTRAVENOUS at 11:16

## 2025-03-03 NOTE — H&P
Colonoscopy for history of colon polyps.  History and Physical - [facility]  Lenka Wayne 59 y.o. female MRN: [id number]                  HPI: Lenka Wayne is a 59 y.o. year old female who presents for colonoscopy for history of colon polyps.      REVIEW OF SYSTEMS: Per the HPI, and otherwise unremarkable.    Historical Information   Past Medical History:   Diagnosis Date    Allergic 1971    Anxiety 1993    Arthritis     Atrial fibrillation (HCC) 10/2020    CPAP (continuous positive airway pressure) dependence     Depression 1993    Disease of thyroid gland     Environmental allergies     Fibroids     uterine   LTH  BSO  today   9/6/2024    Hyperlipidemia     Hypertension     Intramural leiomyoma of uterus 07/01/2024    Obesity Post puberty    Sleep apnea      Past Surgical History:   Procedure Laterality Date    BREAST BIOPSY Left 2012    stereotactic bx - benign    COLONOSCOPY      HYSTERECTOMY  06/06/2024    JOINT REPLACEMENT      KNEE SURGERY Left 02/17/2023    OOPHORECTOMY Bilateral 09/06/2024    [place] HYSTEROSCOPY BX ENDOMETRIUM&/POLYPC W/WO D&C N/A 09/08/2023    Procedure: (D&C) W/ HYSTEROSCOPY;  Surgeon: Barbara Parr MD;  [facility];  Service: Gynecology    OH LAPS TOTAL HYSTERECT 250 GM/< W/RMVL TUBE/OVARY N/A 09/06/2024    Procedure: ROBOTIC ASSISTED TOTAL LAPAROSCOPIC HYSTERECTOMY, BSO, EUA;  Surgeon: Kody Brewster MD;  [facility];  Service: Gynecology Oncology     Social History   Social History     Substance and Sexual Activity   Alcohol Use Not Currently     Social History     Substance and Sexual Activity   Drug Use Never    Comment: Recovering alcoholic     1991     Social History     Tobacco Use   Smoking Status Never    Passive exposure: Never   Smokeless Tobacco Never     Family History   Problem Relation Age of Onset    Cancer Mother     Depression Mother     Hypertension Mother     Heart disease Mother     Thyroid disease Mother      Arthritis Mother     Esophageal cancer Father     Prostate cancer Father     Alcohol abuse Father     Hypertension Father     Heart disease Father     Cancer Father     No Known Problems Sister     No Known Problems Sister     No Known Problems Maternal Grandmother     Prostate cancer Maternal Grandfather     No Known Problems Paternal Grandmother     No Known Problems Paternal Grandfather     Stomach cancer Maternal Aunt     Leukemia Maternal Aunt     Breast cancer Maternal Aunt     Breast cancer Paternal Aunt     No Known Problems Paternal Aunt     Depression Sister     Hypertension Sister     Heart disease Sister     Depression Sister     Heart disease Sister        Meds/Allergies     Not in a hospital admission.    Allergies   Allergen Reactions    Food Other (See Comments)     Wheat GI upset; joint inflammation    Other Other (See Comments)     ENVIRONMENTAL ALLERGENS. Pollen, dust, mold : sneezing  PLANTS. Sneezing  PET DANDER. Sneezing      Penicillins Other (See Comments)     Family hx of allergy to penicillin    Wheat Bran - Food Allergy Abdominal Pain    Horse-Derived Products Rash     Horse serum       Objective     There were no vitals taken for this visit.      PHYSICAL EXAM    Gen: NAD  CV: RRR  CHEST: Clear  ABD: soft, NT/ND  EXT: no edema      ASSESSMENT/PLAN:  Lenka Wayne is a 59 y.o. year old female who presents for colonoscopy for history of colon polyps. The patient is stable and optimized for the procedure, we reviewed risk and benefits. Risk include but not limited to infection, bleeding, perforation and missing a lesion.

## 2025-03-03 NOTE — ANESTHESIA PREPROCEDURE EVALUATION
Procedure:  COLONOSCOPY    Relevant Problems   CARDIO   (+) Atrial fibrillation (HCC)   (+) Mixed hyperlipidemia   (+) Primary hypertension      ENDO   (+) Acquired hypothyroidism      NEURO/PSYCH   (+) Anxiety   (+) Dysthymic disorder   (+) Mild episode of recurrent major depressive disorder (HCC)      PULMONARY   (+) Obstructive sleep apnea syndrome      Other   (+) Class 2 obesity without serious comorbidity in adult        Physical Exam    Airway    Mallampati score: II         Dental       Cardiovascular      Pulmonary      Other Findings  post-pubertal.      Anesthesia Plan  ASA Score- 2     Anesthesia Type- IV sedation with anesthesia with ASA Monitors.         Additional Monitors:     Airway Plan:            Plan Factors-    Chart reviewed.                      Induction- intravenous.    Postoperative Plan-         Informed Consent- Anesthetic plan and risks discussed with patient.  I personally reviewed this patient with the CRNA. Discussed and agreed on the Anesthesia Plan with the CRNA..      NPO Status:  No vitals data found for the desired time range.

## 2025-03-03 NOTE — ANESTHESIA POSTPROCEDURE EVALUATION
Post-Op Assessment Note    CV Status:  Stable  Pain Score: 0    Pain management: adequate       Mental Status:  Sleepy and arousable   Hydration Status:  Euvolemic   PONV Controlled:  Controlled   Airway Patency:  Patent     Post Op Vitals Reviewed: Yes    No anethesia notable event occurred.    Staff: Anesthesiologist, CRNA           Last Filed PACU Vitals:  Vitals Value Taken Time   Temp     Pulse 66    /52    Resp 20    SpO2 96

## 2025-03-06 PROCEDURE — 88305 TISSUE EXAM BY PATHOLOGIST: CPT | Performed by: PATHOLOGY

## 2025-03-07 ENCOUNTER — RESULTS FOLLOW-UP (OUTPATIENT)
Dept: GASTROENTEROLOGY | Facility: CLINIC | Age: 60
End: 2025-03-07

## 2025-03-22 DIAGNOSIS — I10 PRIMARY HYPERTENSION: ICD-10-CM

## 2025-03-22 RX ORDER — DILTIAZEM HYDROCHLORIDE 180 MG/1
360 CAPSULE, COATED, EXTENDED RELEASE ORAL DAILY
Qty: 60 CAPSULE | Refills: 0 | Status: SHIPPED | OUTPATIENT
Start: 2025-03-22

## 2025-04-02 DIAGNOSIS — F41.9 ANXIETY: ICD-10-CM

## 2025-04-03 RX ORDER — CITALOPRAM HYDROBROMIDE 20 MG/1
20 TABLET ORAL EVERY MORNING
Qty: 90 TABLET | Refills: 0 | Status: SHIPPED | OUTPATIENT
Start: 2025-04-03

## 2025-04-09 DIAGNOSIS — E78.2 MIXED HYPERLIPIDEMIA: ICD-10-CM

## 2025-04-10 RX ORDER — ATORVASTATIN CALCIUM 10 MG/1
10 TABLET, FILM COATED ORAL EVERY MORNING
Qty: 90 TABLET | Refills: 0 | Status: SHIPPED | OUTPATIENT
Start: 2025-04-10

## 2025-04-23 DIAGNOSIS — I10 PRIMARY HYPERTENSION: ICD-10-CM

## 2025-04-23 RX ORDER — DILTIAZEM HYDROCHLORIDE 180 MG/1
360 CAPSULE, COATED, EXTENDED RELEASE ORAL DAILY
Qty: 60 CAPSULE | Refills: 5 | Status: SHIPPED | OUTPATIENT
Start: 2025-04-23

## 2025-07-02 DIAGNOSIS — F41.9 ANXIETY: ICD-10-CM

## 2025-07-03 RX ORDER — CITALOPRAM HYDROBROMIDE 20 MG/1
20 TABLET ORAL EVERY MORNING
Qty: 90 TABLET | Refills: 1 | Status: SHIPPED | OUTPATIENT
Start: 2025-07-03

## 2025-07-09 DIAGNOSIS — E78.2 MIXED HYPERLIPIDEMIA: ICD-10-CM

## 2025-07-11 RX ORDER — ATORVASTATIN CALCIUM 10 MG/1
10 TABLET, FILM COATED ORAL EVERY MORNING
Qty: 90 TABLET | Refills: 2 | Status: SHIPPED | OUTPATIENT
Start: 2025-07-11

## (undated) DEVICE — Device: Brand: TISSUE RETRIEVAL SYSTEM

## (undated) DEVICE — VESSEL SEALER EXTEND: Brand: ENDOWRIST

## (undated) DEVICE — PREMIUM DRY TRAY LF: Brand: MEDLINE INDUSTRIES, INC.

## (undated) DEVICE — STANDARD SURGICAL GOWN, L: Brand: CONVERTORS

## (undated) DEVICE — SCD SEQUENTIAL COMPRESSION COMFORT SLEEVE MEDIUM KNEE LENGTH: Brand: KENDALL SCD

## (undated) DEVICE — ARM DRAPE

## (undated) DEVICE — PAD GROUNDING DUAL ADULT

## (undated) DEVICE — BLADELESS OBTURATOR: Brand: WECK VISTA

## (undated) DEVICE — EXIDINE 4 PCT

## (undated) DEVICE — IV EXTENSION TUBING 33 IN

## (undated) DEVICE — GLOVE INDICATOR PI UNDERGLOVE SZ 7.5 BLUE

## (undated) DEVICE — METZENBAUM ADTEC SINGLE USE DISSECTING SCISSORS, SHAFT ONLY, MONOPOLAR, CURVED TO LEFT, WORKING LENGTH: 12 1/4", (310 MM), DIAM. 5 MM, INSULATED, DOUBLE ACTION, STERILE, DISPOSABLE, PACKAGE OF 10 PIECES: Brand: AESCULAP

## (undated) DEVICE — TIP COVER ACCESSORY

## (undated) DEVICE — COLUMN DRAPE

## (undated) DEVICE — DRAPE EQUIPMENT RF WAND

## (undated) DEVICE — DISPOSABLE OR TOWEL: Brand: CARDINAL HEALTH

## (undated) DEVICE — KIT, BETHLEHEM ROBOTIC PROST: Brand: CARDINAL HEALTH

## (undated) DEVICE — UNDER BUTTOCKS DRAPE W/FLUID CONTROL POUCH: Brand: CONVERTORS

## (undated) DEVICE — [HIGH FLOW INSUFFLATOR,  DO NOT USE IF PACKAGE IS DAMAGED,  KEEP DRY,  KEEP AWAY FROM SUNLIGHT,  PROTECT FROM HEAT AND RADIOACTIVE SOURCES.]: Brand: PNEUMOSURE

## (undated) DEVICE — CYSTO TUBING SINGLE IRRIGATION

## (undated) DEVICE — EXOFIN PRECISION PEN HIGH VISCOSITY TOPICAL SKIN ADHESIVE: Brand: EXOFIN PRECISION PEN, 1G

## (undated) DEVICE — MONOPOLAR CURVED SCISSORS: Brand: ENDOWRIST

## (undated) DEVICE — GLOVE PI ULTRA TOUCH SZ.6.5

## (undated) DEVICE — GLOVE PI ULTRA TOUCH SZ.7.5

## (undated) DEVICE — SYRINGE 10ML LL CONTROL TOP

## (undated) DEVICE — VISUALIZATION SYSTEM: Brand: CLEARIFY

## (undated) DEVICE — GLOVE INDICATOR PI UNDERGLOVE SZ 7 BLUE

## (undated) DEVICE — Device

## (undated) DEVICE — CANNULA SEAL

## (undated) DEVICE — TRAY FOLEY 16FR URIMETER SILICONE SURESTEP

## (undated) DEVICE — NEEDLE SPINAL 22G X 3.5IN  QUINCKE

## (undated) DEVICE — ELECTRO LUBE IS A SINGLE PATIENT USE DEVICE THAT IS INTENDED TO BE USED ON ELECTROSURGICAL ELECTRODES TO REDUCE STICKING.: Brand: KEY SURGICAL ELECTRO LUBE

## (undated) DEVICE — MEGA SUTURECUT ND: Brand: ENDOWRIST

## (undated) DEVICE — INTENDED FOR TISSUE SEPARATION, AND OTHER PROCEDURES THAT REQUIRE A SHARP SURGICAL BLADE TO PUNCTURE OR CUT.: Brand: BARD-PARKER SAFETY BLADES SIZE 11, STERILE

## (undated) DEVICE — PROGRASP FORCEPS: Brand: ENDOWRIST

## (undated) DEVICE — 40595 XL TRENDELENBURG POSITIONING KIT: Brand: 40595 XL TRENDELENBURG POSITIONING KIT

## (undated) DEVICE — AIRSEAL TUBE SMOKE EVAC LUMENX3 FILTERED

## (undated) DEVICE — TROCAR PORT ACCESS 5 X120MML W/BLDLS OPTICAL TIP AIRSEAL

## (undated) DEVICE — SUT MONOCRYL 4-0 PS-2 27 IN Y426H

## (undated) DEVICE — GLOVE INDICATOR PI UNDERGLOVE SZ 6.5 BLUE

## (undated) DEVICE — STRL COTTON TIP APPLCTR 6IN PK: Brand: CARDINAL HEALTH

## (undated) DEVICE — 2000CC GUARDIAN II: Brand: GUARDIAN

## (undated) DEVICE — ENDOPATH PNEUMONEEDLE INSUFFLATION NEEDLES WITH LUER LOCK CONNECTORS 120MM: Brand: ENDOPATH

## (undated) DEVICE — SUT STRATAFIX SPIRAL 2-0 CT-1 30 CM SXPP1B410

## (undated) DEVICE — 3000CC GUARDIAN II: Brand: GUARDIAN

## (undated) DEVICE — PVC URETHRAL CATHETER: Brand: DOVER

## (undated) DEVICE — STRL ALLENTOWN HYSTEROSCOPY PK: Brand: CARDINAL HEALTH